# Patient Record
Sex: FEMALE | Race: WHITE | Employment: UNEMPLOYED | URBAN - METROPOLITAN AREA
[De-identification: names, ages, dates, MRNs, and addresses within clinical notes are randomized per-mention and may not be internally consistent; named-entity substitution may affect disease eponyms.]

---

## 2021-01-01 ENCOUNTER — APPOINTMENT (OUTPATIENT)
Dept: ULTRASOUND IMAGING | Age: 71
DRG: 749 | End: 2021-01-01
Attending: FAMILY MEDICINE
Payer: MEDICARE

## 2021-01-01 ENCOUNTER — HOSPITAL ENCOUNTER (EMERGENCY)
Age: 71
Discharge: ANOTHER HOSPITAL | End: 2021-02-02
Attending: EMERGENCY MEDICINE
Payer: MEDICARE

## 2021-01-01 ENCOUNTER — TELEPHONE (OUTPATIENT)
Dept: ONCOLOGY | Age: 71
End: 2021-01-01

## 2021-01-01 ENCOUNTER — APPOINTMENT (OUTPATIENT)
Dept: CT IMAGING | Age: 71
DRG: 749 | End: 2021-01-01
Attending: FAMILY MEDICINE
Payer: MEDICARE

## 2021-01-01 ENCOUNTER — TELEPHONE (OUTPATIENT)
Dept: INFUSION THERAPY | Facility: MEDICAL CENTER | Age: 71
End: 2021-01-01

## 2021-01-01 ENCOUNTER — HOSPITAL ENCOUNTER (OUTPATIENT)
Facility: MEDICAL CENTER | Age: 71
End: 2021-01-01
Payer: MEDICARE

## 2021-01-01 ENCOUNTER — HOSPITAL ENCOUNTER (OUTPATIENT)
Dept: INFUSION THERAPY | Facility: MEDICAL CENTER | Age: 71
Discharge: HOME | End: 2021-02-08
Payer: MEDICARE

## 2021-01-01 ENCOUNTER — HOSPITAL ENCOUNTER (INPATIENT)
Age: 71
LOS: 4 days | Discharge: HOME | DRG: 749 | End: 2021-02-06
Attending: FAMILY MEDICINE | Admitting: FAMILY MEDICINE
Payer: MEDICARE

## 2021-01-01 ENCOUNTER — APPOINTMENT (OUTPATIENT)
Dept: CT IMAGING | Age: 71
End: 2021-01-01
Payer: MEDICARE

## 2021-01-01 ENCOUNTER — ANESTHESIA (OUTPATIENT)
Dept: OPERATING ROOM | Age: 71
End: 2021-01-01

## 2021-01-01 ENCOUNTER — OFFICE VISIT (OUTPATIENT)
Dept: ONCOLOGY | Age: 71
End: 2021-01-01
Payer: MEDICARE

## 2021-01-01 ENCOUNTER — APPOINTMENT (OUTPATIENT)
Dept: INTERVENTIONAL RADIOLOGY/VASCULAR | Age: 71
DRG: 749 | End: 2021-01-01
Attending: FAMILY MEDICINE
Payer: MEDICARE

## 2021-01-01 ENCOUNTER — APPOINTMENT (OUTPATIENT)
Dept: GENERAL RADIOLOGY | Age: 71
End: 2021-01-01
Payer: MEDICARE

## 2021-01-01 ENCOUNTER — ANESTHESIA EVENT (OUTPATIENT)
Dept: OPERATING ROOM | Age: 71
End: 2021-01-01

## 2021-01-01 ENCOUNTER — SOCIAL WORK (OUTPATIENT)
Dept: ONCOLOGY | Age: 71
End: 2021-01-01

## 2021-01-01 ENCOUNTER — INITIAL CONSULT (OUTPATIENT)
Dept: ONCOLOGY | Age: 71
End: 2021-01-01
Payer: MEDICARE

## 2021-01-01 ENCOUNTER — APPOINTMENT (OUTPATIENT)
Dept: GENERAL RADIOLOGY | Age: 71
DRG: 749 | End: 2021-01-01
Attending: FAMILY MEDICINE
Payer: MEDICARE

## 2021-01-01 VITALS
SYSTOLIC BLOOD PRESSURE: 128 MMHG | TEMPERATURE: 97.3 F | WEIGHT: 184.08 LBS | HEART RATE: 68 BPM | OXYGEN SATURATION: 90 % | DIASTOLIC BLOOD PRESSURE: 82 MMHG | RESPIRATION RATE: 22 BRPM

## 2021-01-01 VITALS
DIASTOLIC BLOOD PRESSURE: 103 MMHG | TEMPERATURE: 98.4 F | WEIGHT: 200 LBS | OXYGEN SATURATION: 97 % | SYSTOLIC BLOOD PRESSURE: 162 MMHG | RESPIRATION RATE: 20 BRPM | HEART RATE: 111 BPM

## 2021-01-01 VITALS — WEIGHT: 180.8 LBS | HEIGHT: 68 IN | BODY MASS INDEX: 27.4 KG/M2 | RESPIRATION RATE: 16 BRPM

## 2021-01-01 DIAGNOSIS — C56.2 MALIGNANT NEOPLASM OF LEFT OVARY (HCC): Primary | ICD-10-CM

## 2021-01-01 DIAGNOSIS — J91.0 PLEURAL EFFUSION, MALIGNANT: ICD-10-CM

## 2021-01-01 DIAGNOSIS — R18.0 MALIGNANT ASCITES: ICD-10-CM

## 2021-01-01 DIAGNOSIS — R09.02 HYPOXIA: ICD-10-CM

## 2021-01-01 DIAGNOSIS — N83.8 OVARIAN MASS: Primary | ICD-10-CM

## 2021-01-01 DIAGNOSIS — C76.2 ABDOMINAL CARCINOMATOSIS (HCC): ICD-10-CM

## 2021-01-01 LAB
ABSOLUTE EOS #: 0.03 K/UL (ref 0–0.44)
ABSOLUTE EOS #: <0.03 K/UL (ref 0–0.44)
ABSOLUTE IMMATURE GRANULOCYTE: 0.04 K/UL (ref 0–0.3)
ABSOLUTE IMMATURE GRANULOCYTE: 0.07 K/UL (ref 0–0.3)
ABSOLUTE LYMPH #: 1.22 K/UL (ref 1.1–3.7)
ABSOLUTE LYMPH #: 1.42 K/UL (ref 1.1–3.7)
ABSOLUTE MONO #: 0.98 K/UL (ref 0.1–1.2)
ABSOLUTE MONO #: 1 K/UL (ref 0.1–1.2)
ALBUMIN FLUID: 2 G/DL
ALBUMIN FLUID: 2.2 G/DL
ALBUMIN FLUID: 2.4 G/DL
ALBUMIN SERPL-MCNC: 2.9 G/DL (ref 3.5–5.2)
ALBUMIN SERPL-MCNC: 2.9 G/DL (ref 3.5–5.2)
ALBUMIN SERPL-MCNC: 3 G/DL (ref 3.5–5.2)
ALBUMIN/GLOBULIN RATIO: ABNORMAL (ref 1–2.5)
ALBUMIN/GLOBULIN RATIO: ABNORMAL (ref 1–2.5)
ALP BLD-CCNC: 123 U/L (ref 35–104)
ALP BLD-CCNC: 134 U/L (ref 35–104)
ALT SERPL-CCNC: 8 U/L (ref 5–33)
ALT SERPL-CCNC: 8 U/L (ref 5–33)
AMYLASE FLUID: 53 U/L
AMYLASE FLUID: 53 U/L
AMYLASE FLUID: 85 U/L
AMYLASE: 95 U/L (ref 28–100)
ANION GAP SERPL CALCULATED.3IONS-SCNC: 10 MMOL/L (ref 9–17)
ANION GAP SERPL CALCULATED.3IONS-SCNC: 11 MMOL/L (ref 9–17)
ANION GAP SERPL CALCULATED.3IONS-SCNC: 12 MMOL/L (ref 9–17)
ANION GAP SERPL CALCULATED.3IONS-SCNC: 14 MMOL/L (ref 9–17)
ANION GAP SERPL CALCULATED.3IONS-SCNC: 9 MMOL/L (ref 9–17)
APPEARANCE FLUID: NORMAL
AST SERPL-CCNC: 19 U/L
AST SERPL-CCNC: 22 U/L
BASO FLUID: NORMAL %
BASOPHILS # BLD: 0 % (ref 0–2)
BASOPHILS # BLD: 0 % (ref 0–2)
BASOPHILS ABSOLUTE: <0.03 K/UL (ref 0–0.2)
BASOPHILS ABSOLUTE: <0.03 K/UL (ref 0–0.2)
BILIRUB SERPL-MCNC: 0.75 MG/DL (ref 0.3–1.2)
BILIRUB SERPL-MCNC: 0.86 MG/DL (ref 0.3–1.2)
BILIRUBIN DIRECT: 0.45 MG/DL
BILIRUBIN, INDIRECT: 0.3 MG/DL (ref 0–1)
BNP INTERPRETATION: ABNORMAL
BUN BLDV-MCNC: 14 MG/DL (ref 8–23)
BUN BLDV-MCNC: 19 MG/DL (ref 8–23)
BUN BLDV-MCNC: 22 MG/DL (ref 8–23)
BUN BLDV-MCNC: 22 MG/DL (ref 8–23)
BUN BLDV-MCNC: 28 MG/DL (ref 8–23)
BUN/CREAT BLD: 22 (ref 9–20)
BUN/CREAT BLD: 33 (ref 9–20)
BUN/CREAT BLD: ABNORMAL (ref 9–20)
CA 125: 616 U/ML
CALCIUM SERPL-MCNC: 8.3 MG/DL (ref 8.6–10.4)
CALCIUM SERPL-MCNC: 8.3 MG/DL (ref 8.6–10.4)
CALCIUM SERPL-MCNC: 8.4 MG/DL (ref 8.6–10.4)
CALCIUM SERPL-MCNC: 8.8 MG/DL (ref 8.6–10.4)
CALCIUM SERPL-MCNC: 8.8 MG/DL (ref 8.6–10.4)
CARCINOEMBRYONIC ANTIGEN: 1.4 NG/ML
CASE NUMBER:: NORMAL
CHLORIDE BLD-SCNC: 100 MMOL/L (ref 98–107)
CHLORIDE BLD-SCNC: 93 MMOL/L (ref 98–107)
CHLORIDE BLD-SCNC: 94 MMOL/L (ref 98–107)
CHLORIDE BLD-SCNC: 97 MMOL/L (ref 98–107)
CHLORIDE BLD-SCNC: 97 MMOL/L (ref 98–107)
CO2: 26 MMOL/L (ref 20–31)
CO2: 27 MMOL/L (ref 20–31)
CO2: 28 MMOL/L (ref 20–31)
COLOR FLUID: NORMAL
CREAT SERPL-MCNC: 0.5 MG/DL (ref 0.5–0.9)
CREAT SERPL-MCNC: 0.69 MG/DL (ref 0.5–0.9)
CREAT SERPL-MCNC: 0.81 MG/DL (ref 0.5–0.9)
CREAT SERPL-MCNC: 0.84 MG/DL (ref 0.5–0.9)
CREAT SERPL-MCNC: 1.02 MG/DL (ref 0.5–0.9)
CULTURE: ABNORMAL
D-DIMER QUANTITATIVE: 15.02 MG/L FEU
D-DIMER QUANTITATIVE: 19.84 MG/L FEU (ref 0–0.59)
DIFFERENTIAL TYPE: ABNORMAL
DIFFERENTIAL TYPE: ABNORMAL
DIRECT EXAM: ABNORMAL
DIRECT EXAM: NORMAL
EKG ATRIAL RATE: 79 BPM
EKG ATRIAL RATE: 80 BPM
EKG P AXIS: -2 DEGREES
EKG P AXIS: 1 DEGREES
EKG P-R INTERVAL: 160 MS
EKG P-R INTERVAL: 162 MS
EKG Q-T INTERVAL: 384 MS
EKG Q-T INTERVAL: 388 MS
EKG QRS DURATION: 74 MS
EKG QRS DURATION: 76 MS
EKG QTC CALCULATION (BAZETT): 442 MS
EKG QTC CALCULATION (BAZETT): 444 MS
EKG R AXIS: -33 DEGREES
EKG R AXIS: -34 DEGREES
EKG T AXIS: 35 DEGREES
EKG T AXIS: 38 DEGREES
EKG VENTRICULAR RATE: 79 BPM
EKG VENTRICULAR RATE: 80 BPM
EOSINOPHIL FLUID: NORMAL %
EOSINOPHILS RELATIVE PERCENT: 0 % (ref 1–4)
EOSINOPHILS RELATIVE PERCENT: 0 % (ref 1–4)
FLOW CYTOMETRY SOURCE: NORMAL
FLOW CYTOMETRY SOURCE: NORMAL
FLOW CYTOMETRY, NODE/FLUID: NORMAL
FLOW CYTOMETRY, NODE/FLUID: NORMAL
FLUID DIFF COMMENT: NORMAL
GFR AFRICAN AMERICAN: >60 ML/MIN
GFR NON-AFRICAN AMERICAN: 54 ML/MIN
GFR NON-AFRICAN AMERICAN: >60 ML/MIN
GFR SERPL CREATININE-BSD FRML MDRD: ABNORMAL ML/MIN/{1.73_M2}
GLOBULIN: ABNORMAL G/DL (ref 1.5–3.8)
GLUCOSE BLD-MCNC: 110 MG/DL (ref 70–99)
GLUCOSE BLD-MCNC: 125 MG/DL (ref 70–99)
GLUCOSE BLD-MCNC: 79 MG/DL (ref 70–99)
GLUCOSE BLD-MCNC: 90 MG/DL (ref 70–99)
GLUCOSE BLD-MCNC: 90 MG/DL (ref 70–99)
GLUCOSE, FLUID: 90 MG/DL
GLUCOSE, FLUID: 91 MG/DL
GLUCOSE, FLUID: 94 MG/DL
HCT VFR BLD CALC: 45.6 % (ref 36.3–47.1)
HCT VFR BLD CALC: 45.8 % (ref 36.3–47.1)
HCT VFR BLD CALC: 45.9 % (ref 36.3–47.1)
HCT VFR BLD CALC: 46.7 % (ref 36.3–47.1)
HCT VFR BLD CALC: 47.1 % (ref 36.3–47.1)
HCT VFR BLD CALC: 50.1 % (ref 36.3–47.1)
HEMOGLOBIN: 13.4 G/DL (ref 11.9–15.1)
HEMOGLOBIN: 13.9 G/DL (ref 11.9–15.1)
HEMOGLOBIN: 14 G/DL (ref 11.9–15.1)
HEMOGLOBIN: 14.2 G/DL (ref 11.9–15.1)
HEMOGLOBIN: 14.3 G/DL (ref 11.9–15.1)
HEMOGLOBIN: 15.4 G/DL (ref 11.9–15.1)
IMMATURE GRANULOCYTES: 1 %
IMMATURE GRANULOCYTES: 1 %
INR BLD: 1.4
INR BLD: 1.7
INR BLD: 1.8
INR BLD: 1.8
LACTATE DEHYDROGENASE, FLUID: 390 U/L
LACTATE DEHYDROGENASE, FLUID: 432 U/L
LACTATE DEHYDROGENASE, FLUID: 598 U/L
LACTATE DEHYDROGENASE: 410 U/L (ref 135–214)
LIPASE: 33 U/L (ref 13–60)
LV EF: 55 %
LVEF MODALITY: NORMAL
LYMPHOCYTES # BLD: 14 % (ref 24–43)
LYMPHOCYTES # BLD: 14 % (ref 24–43)
LYMPHOCYTES, BODY FLUID: 32 %
LYMPHOCYTES, BODY FLUID: 66 %
LYMPHOCYTES, BODY FLUID: 68 %
Lab: ABNORMAL
Lab: NORMAL
MCH RBC QN AUTO: 23.8 PG (ref 25.2–33.5)
MCH RBC QN AUTO: 23.9 PG (ref 25.2–33.5)
MCH RBC QN AUTO: 24.1 PG (ref 25.2–33.5)
MCH RBC QN AUTO: 24.1 PG (ref 25.2–33.5)
MCH RBC QN AUTO: 24.3 PG (ref 25.2–33.5)
MCHC RBC AUTO-ENTMCNC: 29.4 G/DL (ref 28.4–34.8)
MCHC RBC AUTO-ENTMCNC: 30.3 G/DL (ref 28.4–34.8)
MCHC RBC AUTO-ENTMCNC: 30.4 G/DL (ref 28.4–34.8)
MCHC RBC AUTO-ENTMCNC: 30.5 G/DL (ref 28.4–34.8)
MCHC RBC AUTO-ENTMCNC: 30.7 G/DL (ref 28.4–34.8)
MCV RBC AUTO: 78.5 FL (ref 82.6–102.9)
MCV RBC AUTO: 78.9 FL (ref 82.6–102.9)
MCV RBC AUTO: 78.9 FL (ref 82.6–102.9)
MCV RBC AUTO: 79.5 FL (ref 82.6–102.9)
MCV RBC AUTO: 80.9 FL (ref 82.6–102.9)
MONOCYTE, FLUID: NORMAL %
MONOCYTES # BLD: 10 % (ref 3–12)
MONOCYTES # BLD: 12 % (ref 3–12)
NEUTROPHIL, FLUID: 1 %
NEUTROPHIL, FLUID: 1 %
NEUTROPHIL, FLUID: 5 %
NRBC AUTOMATED: 0 PER 100 WBC
OTHER CELLS FLUID: NORMAL %
PARTIAL THROMBOPLASTIN TIME: 26.3 SEC (ref 23.9–33.8)
PDW BLD-RTO: 15.9 % (ref 11.8–14.4)
PDW BLD-RTO: 16 % (ref 11.8–14.4)
PDW BLD-RTO: 16 % (ref 11.8–14.4)
PDW BLD-RTO: 16.1 % (ref 11.8–14.4)
PDW BLD-RTO: 17 % (ref 11.8–14.4)
PH FLUID: 8
PH FLUID: 8.5
PLATELET # BLD: 265 K/UL (ref 138–453)
PLATELET # BLD: 295 K/UL (ref 138–453)
PLATELET # BLD: 319 K/UL (ref 138–453)
PLATELET # BLD: 324 K/UL (ref 138–453)
PLATELET # BLD: 360 K/UL (ref 138–453)
PLATELET ESTIMATE: ABNORMAL
PLATELET ESTIMATE: ABNORMAL
PMV BLD AUTO: 10 FL (ref 8.1–13.5)
PMV BLD AUTO: 10 FL (ref 8.1–13.5)
PMV BLD AUTO: 10.2 FL (ref 8.1–13.5)
PMV BLD AUTO: 10.5 FL (ref 8.1–13.5)
PMV BLD AUTO: 9.5 FL (ref 8.1–13.5)
POTASSIUM SERPL-SCNC: 3.7 MMOL/L (ref 3.7–5.3)
POTASSIUM SERPL-SCNC: 3.8 MMOL/L (ref 3.7–5.3)
POTASSIUM SERPL-SCNC: 4 MMOL/L (ref 3.7–5.3)
POTASSIUM SERPL-SCNC: 4.2 MMOL/L (ref 3.7–5.3)
POTASSIUM SERPL-SCNC: 4.6 MMOL/L (ref 3.7–5.3)
PRO-BNP: 1213 PG/ML
PROTHROMBIN TIME: 14.5 SEC (ref 9–12)
PROTHROMBIN TIME: 18.4 SEC (ref 9–12)
PROTHROMBIN TIME: 18.8 SEC (ref 9–12)
PROTHROMBIN TIME: 19.4 SEC (ref 11.5–14.2)
RBC # BLD: 5.64 M/UL (ref 3.95–5.11)
RBC # BLD: 5.76 M/UL (ref 3.95–5.11)
RBC # BLD: 5.82 M/UL (ref 3.95–5.11)
RBC # BLD: 5.95 M/UL (ref 3.95–5.11)
RBC # BLD: 6.35 M/UL (ref 3.95–5.11)
RBC # BLD: ABNORMAL 10*6/UL
RBC # BLD: ABNORMAL 10*6/UL
RBC FLUID: 6000 /MM3
RBC FLUID: NORMAL /MM3
RBC FLUID: NORMAL /MM3
SARS-COV-2, RAPID: NOT DETECTED
SARS-COV-2: NORMAL
SARS-COV-2: NORMAL
SEG NEUTROPHILS: 73 % (ref 36–65)
SEG NEUTROPHILS: 75 % (ref 36–65)
SEGMENTED NEUTROPHILS ABSOLUTE COUNT: 6.24 K/UL (ref 1.5–8.1)
SEGMENTED NEUTROPHILS ABSOLUTE COUNT: 7.52 K/UL (ref 1.5–8.1)
SODIUM BLD-SCNC: 131 MMOL/L (ref 135–144)
SODIUM BLD-SCNC: 135 MMOL/L (ref 135–144)
SODIUM BLD-SCNC: 136 MMOL/L (ref 135–144)
SOURCE: NORMAL
SPECIMEN DESCRIPTION: ABNORMAL
SPECIMEN DESCRIPTION: NORMAL
SPECIMEN TYPE: NORMAL
SURGICAL PATHOLOGY REPORT: NORMAL
SURGICAL PATHOLOGY REPORT: NORMAL
TOTAL PROTEIN, BODY FLUID: 5.3 G/DL
TOTAL PROTEIN, BODY FLUID: 5.3 G/DL
TOTAL PROTEIN, BODY FLUID: 5.7 G/DL
TOTAL PROTEIN: 7.1 G/DL (ref 6.4–8.3)
TOTAL PROTEIN: 7.9 G/DL (ref 6.4–8.3)
TOTAL PROTEIN: 8.4 G/DL (ref 6.4–8.3)
TSH SERPL DL<=0.05 MIU/L-ACNC: 0.86 MIU/L (ref 0.3–5)
WBC # BLD: 10 K/UL (ref 3.5–11.3)
WBC # BLD: 6.1 K/UL (ref 3.5–11.3)
WBC # BLD: 6.6 K/UL (ref 3.5–11.3)
WBC # BLD: 7 K/UL (ref 3.5–11.3)
WBC # BLD: 8.5 K/UL (ref 3.5–11.3)
WBC # BLD: ABNORMAL 10*3/UL
WBC # BLD: ABNORMAL 10*3/UL
WBC FLUID: 1422 /MM3
WBC FLUID: 2946 /MM3
WBC FLUID: 598 /MM3

## 2021-01-01 PROCEDURE — 80048 BASIC METABOLIC PNL TOTAL CA: CPT

## 2021-01-01 PROCEDURE — 80053 COMPREHEN METABOLIC PANEL: CPT

## 2021-01-01 PROCEDURE — 99223 1ST HOSP IP/OBS HIGH 75: CPT | Performed by: FAMILY MEDICINE

## 2021-01-01 PROCEDURE — 87206 SMEAR FLUORESCENT/ACID STAI: CPT

## 2021-01-01 PROCEDURE — 99232 SBSQ HOSP IP/OBS MODERATE 35: CPT | Performed by: INTERNAL MEDICINE

## 2021-01-01 PROCEDURE — 88341 IMHCHEM/IMCYTCHM EA ADD ANTB: CPT

## 2021-01-01 PROCEDURE — 82945 GLUCOSE OTHER FLUID: CPT

## 2021-01-01 PROCEDURE — 6370000000 HC RX 637 (ALT 250 FOR IP): Performed by: FAMILY MEDICINE

## 2021-01-01 PROCEDURE — 84157 ASSAY OF PROTEIN OTHER: CPT

## 2021-01-01 PROCEDURE — 85379 FIBRIN DEGRADATION QUANT: CPT

## 2021-01-01 PROCEDURE — 82150 ASSAY OF AMYLASE: CPT

## 2021-01-01 PROCEDURE — 77001 FLUOROGUIDE FOR VEIN DEVICE: CPT

## 2021-01-01 PROCEDURE — 2500000003 HC RX 250 WO HCPCS: Performed by: STUDENT IN AN ORGANIZED HEALTH CARE EDUCATION/TRAINING PROGRAM

## 2021-01-01 PROCEDURE — 84155 ASSAY OF PROTEIN SERUM: CPT

## 2021-01-01 PROCEDURE — 83690 ASSAY OF LIPASE: CPT

## 2021-01-01 PROCEDURE — 36415 COLL VENOUS BLD VENIPUNCTURE: CPT

## 2021-01-01 PROCEDURE — 99223 1ST HOSP IP/OBS HIGH 75: CPT | Performed by: OBSTETRICS & GYNECOLOGY

## 2021-01-01 PROCEDURE — 88342 IMHCHEM/IMCYTCHM 1ST ANTB: CPT

## 2021-01-01 PROCEDURE — 88185 FLOWCYTOMETRY/TC ADD-ON: CPT

## 2021-01-01 PROCEDURE — 2580000003 HC RX 258: Performed by: INTERNAL MEDICINE

## 2021-01-01 PROCEDURE — 74177 CT ABD & PELVIS W/CONTRAST: CPT

## 2021-01-01 PROCEDURE — 96413 CHEMO IV INFUSION 1 HR: CPT

## 2021-01-01 PROCEDURE — 0W9B3ZZ DRAINAGE OF LEFT PLEURAL CAVITY, PERCUTANEOUS APPROACH: ICD-10-PCS | Performed by: PHYSICIAN ASSISTANT

## 2021-01-01 PROCEDURE — C1788 PORT, INDWELLING, IMP: HCPCS

## 2021-01-01 PROCEDURE — 85025 COMPLETE CBC W/AUTO DIFF WBC: CPT

## 2021-01-01 PROCEDURE — 85014 HEMATOCRIT: CPT

## 2021-01-01 PROCEDURE — 36591 DRAW BLOOD OFF VENOUS DEVICE: CPT

## 2021-01-01 PROCEDURE — 6360000004 HC RX CONTRAST MEDICATION: Performed by: NURSE PRACTITIONER

## 2021-01-01 PROCEDURE — 88112 CYTOPATH CELL ENHANCE TECH: CPT

## 2021-01-01 PROCEDURE — 2500000003 HC RX 250 WO HCPCS: Performed by: NURSE PRACTITIONER

## 2021-01-01 PROCEDURE — 88305 TISSUE EXAM BY PATHOLOGIST: CPT

## 2021-01-01 PROCEDURE — 96365 THER/PROPH/DIAG IV INF INIT: CPT

## 2021-01-01 PROCEDURE — 85610 PROTHROMBIN TIME: CPT

## 2021-01-01 PROCEDURE — 2709999900 US THORACENTESIS

## 2021-01-01 PROCEDURE — 6360000002 HC RX W HCPCS: Performed by: RADIOLOGY

## 2021-01-01 PROCEDURE — 99222 1ST HOSP IP/OBS MODERATE 55: CPT | Performed by: INTERNAL MEDICINE

## 2021-01-01 PROCEDURE — 94640 AIRWAY INHALATION TREATMENT: CPT

## 2021-01-01 PROCEDURE — 82378 CARCINOEMBRYONIC ANTIGEN: CPT

## 2021-01-01 PROCEDURE — 87205 SMEAR GRAM STAIN: CPT

## 2021-01-01 PROCEDURE — 85027 COMPLETE CBC AUTOMATED: CPT

## 2021-01-01 PROCEDURE — 6370000000 HC RX 637 (ALT 250 FOR IP): Performed by: NURSE PRACTITIONER

## 2021-01-01 PROCEDURE — 89051 BODY FLUID CELL COUNT: CPT

## 2021-01-01 PROCEDURE — 87075 CULTR BACTERIA EXCEPT BLOOD: CPT

## 2021-01-01 PROCEDURE — 87070 CULTURE OTHR SPECIMN AEROBIC: CPT

## 2021-01-01 PROCEDURE — 2580000003 HC RX 258: Performed by: NURSE PRACTITIONER

## 2021-01-01 PROCEDURE — 02HV33Z INSERTION OF INFUSION DEVICE INTO SUPERIOR VENA CAVA, PERCUTANEOUS APPROACH: ICD-10-PCS | Performed by: PHYSICIAN ASSISTANT

## 2021-01-01 PROCEDURE — 1090F PRES/ABSN URINE INCON ASSESS: CPT | Performed by: INTERNAL MEDICINE

## 2021-01-01 PROCEDURE — 83615 LACTATE (LD) (LDH) ENZYME: CPT

## 2021-01-01 PROCEDURE — 1111F DSCHRG MED/CURRENT MED MERGE: CPT | Performed by: INTERNAL MEDICINE

## 2021-01-01 PROCEDURE — 2060000000 HC ICU INTERMEDIATE R&B

## 2021-01-01 PROCEDURE — 99211 OFF/OP EST MAY X REQ PHY/QHP: CPT

## 2021-01-01 PROCEDURE — 1123F ACP DISCUSS/DSCN MKR DOCD: CPT | Performed by: INTERNAL MEDICINE

## 2021-01-01 PROCEDURE — 83986 ASSAY PH BODY FLUID NOS: CPT

## 2021-01-01 PROCEDURE — 97162 PT EVAL MOD COMPLEX 30 MIN: CPT

## 2021-01-01 PROCEDURE — 94761 N-INVAS EAR/PLS OXIMETRY MLT: CPT

## 2021-01-01 PROCEDURE — 86403 PARTICLE AGGLUT ANTBDY SCRN: CPT

## 2021-01-01 PROCEDURE — APPSS45 APP SPLIT SHARED TIME 31-45 MINUTES: Performed by: NURSE PRACTITIONER

## 2021-01-01 PROCEDURE — 93306 TTE W/DOPPLER COMPLETE: CPT

## 2021-01-01 PROCEDURE — 71046 X-RAY EXAM CHEST 2 VIEWS: CPT

## 2021-01-01 PROCEDURE — 4040F PNEUMOC VAC/ADMIN/RCVD: CPT | Performed by: INTERNAL MEDICINE

## 2021-01-01 PROCEDURE — 2580000003 HC RX 258: Performed by: RADIOLOGY

## 2021-01-01 PROCEDURE — 87102 FUNGUS ISOLATION CULTURE: CPT

## 2021-01-01 PROCEDURE — 96375 TX/PRO/DX INJ NEW DRUG ADDON: CPT

## 2021-01-01 PROCEDURE — 2709999900 HC NON-CHARGEABLE SUPPLY

## 2021-01-01 PROCEDURE — 96417 CHEMO IV INFUS EACH ADDL SEQ: CPT

## 2021-01-01 PROCEDURE — P9047 ALBUMIN (HUMAN), 25%, 50ML: HCPCS | Performed by: PHYSICIAN ASSISTANT

## 2021-01-01 PROCEDURE — 6360000002 HC RX W HCPCS: Performed by: NURSE PRACTITIONER

## 2021-01-01 PROCEDURE — G8400 PT W/DXA NO RESULTS DOC: HCPCS | Performed by: INTERNAL MEDICINE

## 2021-01-01 PROCEDURE — 99233 SBSQ HOSP IP/OBS HIGH 50: CPT | Performed by: FAMILY MEDICINE

## 2021-01-01 PROCEDURE — 6360000002 HC RX W HCPCS: Performed by: INTERNAL MEDICINE

## 2021-01-01 PROCEDURE — 0W993ZZ DRAINAGE OF RIGHT PLEURAL CAVITY, PERCUTANEOUS APPROACH: ICD-10-PCS | Performed by: PHYSICIAN ASSISTANT

## 2021-01-01 PROCEDURE — 96372 THER/PROPH/DIAG INJ SC/IM: CPT

## 2021-01-01 PROCEDURE — 87186 SC STD MICRODIL/AGAR DIL: CPT

## 2021-01-01 PROCEDURE — 97530 THERAPEUTIC ACTIVITIES: CPT

## 2021-01-01 PROCEDURE — C1894 INTRO/SHEATH, NON-LASER: HCPCS

## 2021-01-01 PROCEDURE — 99215 OFFICE O/P EST HI 40 MIN: CPT | Performed by: INTERNAL MEDICINE

## 2021-01-01 PROCEDURE — G8427 DOCREV CUR MEDS BY ELIG CLIN: HCPCS | Performed by: INTERNAL MEDICINE

## 2021-01-01 PROCEDURE — 71260 CT THORAX DX C+: CPT

## 2021-01-01 PROCEDURE — 87077 CULTURE AEROBIC IDENTIFY: CPT

## 2021-01-01 PROCEDURE — 99232 SBSQ HOSP IP/OBS MODERATE 35: CPT | Performed by: OBSTETRICS & GYNECOLOGY

## 2021-01-01 PROCEDURE — G8417 CALC BMI ABV UP PARAM F/U: HCPCS | Performed by: INTERNAL MEDICINE

## 2021-01-01 PROCEDURE — 2700000000 HC OXYGEN THERAPY PER DAY

## 2021-01-01 PROCEDURE — 87015 SPECIMEN INFECT AGNT CONCNTJ: CPT

## 2021-01-01 PROCEDURE — 82042 OTHER SOURCE ALBUMIN QUAN EA: CPT

## 2021-01-01 PROCEDURE — 6360000002 HC RX W HCPCS: Performed by: FAMILY MEDICINE

## 2021-01-01 PROCEDURE — C1729 CATH, DRAINAGE: HCPCS

## 2021-01-01 PROCEDURE — 0W9G3ZZ DRAINAGE OF PERITONEAL CAVITY, PERCUTANEOUS APPROACH: ICD-10-PCS | Performed by: PHYSICIAN ASSISTANT

## 2021-01-01 PROCEDURE — 71045 X-RAY EXAM CHEST 1 VIEW: CPT

## 2021-01-01 PROCEDURE — 88184 FLOWCYTOMETRY/ TC 1 MARKER: CPT

## 2021-01-01 PROCEDURE — 2580000003 HC RX 258: Performed by: FAMILY MEDICINE

## 2021-01-01 PROCEDURE — 99232 SBSQ HOSP IP/OBS MODERATE 35: CPT | Performed by: FAMILY MEDICINE

## 2021-01-01 PROCEDURE — 0JH60WZ INSERTION OF TOTALLY IMPLANTABLE VASCULAR ACCESS DEVICE INTO CHEST SUBCUTANEOUS TISSUE AND FASCIA, OPEN APPROACH: ICD-10-PCS | Performed by: PHYSICIAN ASSISTANT

## 2021-01-01 PROCEDURE — 2709999900 US GUIDED PARACENTESIS

## 2021-01-01 PROCEDURE — 85018 HEMOGLOBIN: CPT

## 2021-01-01 PROCEDURE — 2500000003 HC RX 250 WO HCPCS: Performed by: INTERNAL MEDICINE

## 2021-01-01 PROCEDURE — 94660 CPAP INITIATION&MGMT: CPT

## 2021-01-01 PROCEDURE — 99239 HOSP IP/OBS DSCHRG MGMT >30: CPT | Performed by: FAMILY MEDICINE

## 2021-01-01 PROCEDURE — 93970 EXTREMITY STUDY: CPT

## 2021-01-01 PROCEDURE — G8484 FLU IMMUNIZE NO ADMIN: HCPCS | Performed by: INTERNAL MEDICINE

## 2021-01-01 PROCEDURE — U0002 COVID-19 LAB TEST NON-CDC: HCPCS

## 2021-01-01 PROCEDURE — 83880 ASSAY OF NATRIURETIC PEPTIDE: CPT

## 2021-01-01 PROCEDURE — 84443 ASSAY THYROID STIM HORMONE: CPT

## 2021-01-01 PROCEDURE — 80076 HEPATIC FUNCTION PANEL: CPT

## 2021-01-01 PROCEDURE — 97166 OT EVAL MOD COMPLEX 45 MIN: CPT

## 2021-01-01 PROCEDURE — 82040 ASSAY OF SERUM ALBUMIN: CPT

## 2021-01-01 PROCEDURE — 87116 MYCOBACTERIA CULTURE: CPT

## 2021-01-01 PROCEDURE — 85730 THROMBOPLASTIN TIME PARTIAL: CPT

## 2021-01-01 PROCEDURE — 96367 TX/PROPH/DG ADDL SEQ IV INF: CPT

## 2021-01-01 PROCEDURE — 1036F TOBACCO NON-USER: CPT | Performed by: INTERNAL MEDICINE

## 2021-01-01 PROCEDURE — 73562 X-RAY EXAM OF KNEE 3: CPT

## 2021-01-01 PROCEDURE — 36561 INSERT TUNNELED CV CATH: CPT

## 2021-01-01 PROCEDURE — 99283 EMERGENCY DEPT VISIT LOW MDM: CPT

## 2021-01-01 PROCEDURE — 99999 PR OFFICE/OUTPT VISIT,PROCEDURE ONLY: CPT | Performed by: INTERNAL MEDICINE

## 2021-01-01 PROCEDURE — 99223 1ST HOSP IP/OBS HIGH 75: CPT | Performed by: INTERNAL MEDICINE

## 2021-01-01 PROCEDURE — 97535 SELF CARE MNGMENT TRAINING: CPT

## 2021-01-01 PROCEDURE — 6360000004 HC RX CONTRAST MEDICATION

## 2021-01-01 PROCEDURE — 6360000002 HC RX W HCPCS: Performed by: PHYSICIAN ASSISTANT

## 2021-01-01 PROCEDURE — 96415 CHEMO IV INFUSION ADDL HR: CPT

## 2021-01-01 PROCEDURE — 86304 IMMUNOASSAY TUMOR CA 125: CPT

## 2021-01-01 PROCEDURE — 93005 ELECTROCARDIOGRAM TRACING: CPT | Performed by: FAMILY MEDICINE

## 2021-01-01 PROCEDURE — 93010 ELECTROCARDIOGRAM REPORT: CPT | Performed by: INTERNAL MEDICINE

## 2021-01-01 PROCEDURE — 3017F COLORECTAL CA SCREEN DOC REV: CPT | Performed by: INTERNAL MEDICINE

## 2021-01-01 RX ORDER — SODIUM CHLORIDE 0.9 % (FLUSH) 0.9 %
10 SYRINGE (ML) INJECTION PRN
Status: CANCELLED | OUTPATIENT
Start: 2021-03-01

## 2021-01-01 RX ORDER — PROMETHAZINE HYDROCHLORIDE 12.5 MG/1
12.5 TABLET ORAL EVERY 6 HOURS PRN
Status: DISCONTINUED | OUTPATIENT
Start: 2021-01-01 | End: 2021-01-01 | Stop reason: HOSPADM

## 2021-01-01 RX ORDER — METOPROLOL TARTRATE 5 MG/5ML
5 INJECTION INTRAVENOUS ONCE
Status: COMPLETED | OUTPATIENT
Start: 2021-01-01 | End: 2021-01-01

## 2021-01-01 RX ORDER — SODIUM CHLORIDE 0.9 % (FLUSH) 0.9 %
10 SYRINGE (ML) INJECTION EVERY 12 HOURS SCHEDULED
Status: DISCONTINUED | OUTPATIENT
Start: 2021-01-01 | End: 2021-01-01 | Stop reason: HOSPADM

## 2021-01-01 RX ORDER — POTASSIUM CHLORIDE 7.45 MG/ML
10 INJECTION INTRAVENOUS PRN
Status: DISCONTINUED | OUTPATIENT
Start: 2021-01-01 | End: 2021-01-01 | Stop reason: HOSPADM

## 2021-01-01 RX ORDER — POTASSIUM CHLORIDE 20 MEQ/1
40 TABLET, EXTENDED RELEASE ORAL PRN
Status: DISCONTINUED | OUTPATIENT
Start: 2021-01-01 | End: 2021-01-01 | Stop reason: HOSPADM

## 2021-01-01 RX ORDER — HEPARIN SODIUM (PORCINE) LOCK FLUSH IV SOLN 100 UNIT/ML 100 UNIT/ML
SOLUTION INTRAVENOUS
Status: COMPLETED | OUTPATIENT
Start: 2021-01-01 | End: 2021-01-01

## 2021-01-01 RX ORDER — SODIUM CHLORIDE 0.9 % (FLUSH) 0.9 %
10 SYRINGE (ML) INJECTION EVERY 12 HOURS SCHEDULED
Status: DISCONTINUED | OUTPATIENT
Start: 2021-01-01 | End: 2021-01-01 | Stop reason: SDUPTHER

## 2021-01-01 RX ORDER — MEPERIDINE HYDROCHLORIDE 50 MG/ML
12.5 INJECTION INTRAMUSCULAR; INTRAVENOUS; SUBCUTANEOUS ONCE
Status: CANCELLED | OUTPATIENT
Start: 2021-01-01 | End: 2021-01-01

## 2021-01-01 RX ORDER — DIPHENHYDRAMINE HYDROCHLORIDE 50 MG/ML
50 INJECTION INTRAMUSCULAR; INTRAVENOUS ONCE
Status: CANCELLED | OUTPATIENT
Start: 2021-01-01 | End: 2021-01-01

## 2021-01-01 RX ORDER — MEPERIDINE HYDROCHLORIDE 50 MG/ML
12.5 INJECTION INTRAMUSCULAR; INTRAVENOUS; SUBCUTANEOUS ONCE
Status: CANCELLED | OUTPATIENT
Start: 2021-03-22 | End: 2021-03-22

## 2021-01-01 RX ORDER — PALONOSETRON 0.05 MG/ML
0.25 INJECTION, SOLUTION INTRAVENOUS ONCE
Status: CANCELLED | OUTPATIENT
Start: 2021-03-22

## 2021-01-01 RX ORDER — ALBUTEROL SULFATE 2.5 MG/3ML
2.5 SOLUTION RESPIRATORY (INHALATION) EVERY 4 HOURS PRN
Status: DISCONTINUED | OUTPATIENT
Start: 2021-01-01 | End: 2021-01-01 | Stop reason: HOSPADM

## 2021-01-01 RX ORDER — SODIUM CHLORIDE 0.9 % (FLUSH) 0.9 %
10 SYRINGE (ML) INJECTION PRN
Status: DISCONTINUED | OUTPATIENT
Start: 2021-01-01 | End: 2021-01-01

## 2021-01-01 RX ORDER — EPINEPHRINE 1 MG/ML
0.3 INJECTION, SOLUTION, CONCENTRATE INTRAVENOUS PRN
Status: CANCELLED | OUTPATIENT
Start: 2021-03-01

## 2021-01-01 RX ORDER — CARVEDILOL 3.12 MG/1
3.12 TABLET ORAL 2 TIMES DAILY WITH MEALS
Status: DISCONTINUED | OUTPATIENT
Start: 2021-01-01 | End: 2021-01-01

## 2021-01-01 RX ORDER — EPINEPHRINE 1 MG/ML
0.3 INJECTION, SOLUTION, CONCENTRATE INTRAVENOUS PRN
Status: CANCELLED | OUTPATIENT
Start: 2021-03-22

## 2021-01-01 RX ORDER — ONDANSETRON 8 MG/1
8 TABLET, ORALLY DISINTEGRATING ORAL EVERY 8 HOURS PRN
Qty: 30 TABLET | Refills: 3 | Status: SHIPPED | OUTPATIENT
Start: 2021-01-01

## 2021-01-01 RX ORDER — DEXTROSE, SODIUM CHLORIDE, AND POTASSIUM CHLORIDE 5; .45; .15 G/100ML; G/100ML; G/100ML
INJECTION INTRAVENOUS CONTINUOUS
Status: DISCONTINUED | OUTPATIENT
Start: 2021-01-01 | End: 2021-01-01

## 2021-01-01 RX ORDER — MIDODRINE HYDROCHLORIDE 5 MG/1
5 TABLET ORAL ONCE
Status: DISCONTINUED | OUTPATIENT
Start: 2021-01-01 | End: 2021-01-01

## 2021-01-01 RX ORDER — SODIUM CHLORIDE 9 MG/ML
INJECTION, SOLUTION INTRAVENOUS CONTINUOUS
Status: CANCELLED | OUTPATIENT
Start: 2021-03-01

## 2021-01-01 RX ORDER — POLYETHYLENE GLYCOL 3350 17 G/17G
17 POWDER, FOR SOLUTION ORAL DAILY PRN
Status: DISCONTINUED | OUTPATIENT
Start: 2021-01-01 | End: 2021-01-01 | Stop reason: HOSPADM

## 2021-01-01 RX ORDER — SODIUM CHLORIDE 9 MG/ML
INJECTION, SOLUTION INTRAVENOUS CONTINUOUS
Status: DISCONTINUED | OUTPATIENT
Start: 2021-01-01 | End: 2021-01-01 | Stop reason: HOSPADM

## 2021-01-01 RX ORDER — DEXAMETHASONE SODIUM PHOSPHATE 10 MG/ML
10 INJECTION INTRAMUSCULAR; INTRAVENOUS ONCE
Status: COMPLETED | OUTPATIENT
Start: 2021-01-01 | End: 2021-01-01

## 2021-01-01 RX ORDER — FAMOTIDINE 20 MG/1
20 TABLET, FILM COATED ORAL DAILY
Status: DISCONTINUED | OUTPATIENT
Start: 2021-01-01 | End: 2021-01-01 | Stop reason: HOSPADM

## 2021-01-01 RX ORDER — SODIUM CHLORIDE 0.9 % (FLUSH) 0.9 %
10 SYRINGE (ML) INJECTION PRN
Status: DISCONTINUED | OUTPATIENT
Start: 2021-01-01 | End: 2021-01-01 | Stop reason: HOSPADM

## 2021-01-01 RX ORDER — DIPHENHYDRAMINE HYDROCHLORIDE 50 MG/ML
50 INJECTION INTRAMUSCULAR; INTRAVENOUS ONCE
Status: CANCELLED | OUTPATIENT
Start: 2021-03-01 | End: 2021-03-01

## 2021-01-01 RX ORDER — POLYETHYLENE GLYCOL 3350 17 G/17G
17 POWDER, FOR SOLUTION ORAL DAILY PRN
Qty: 527 G | Refills: 0 | Status: SHIPPED | OUTPATIENT
Start: 2021-01-01 | End: 2021-03-08

## 2021-01-01 RX ORDER — MEPERIDINE HYDROCHLORIDE 50 MG/ML
12.5 INJECTION INTRAMUSCULAR; INTRAVENOUS; SUBCUTANEOUS ONCE
Status: CANCELLED | OUTPATIENT
Start: 2021-03-01 | End: 2021-03-01

## 2021-01-01 RX ORDER — HYDRALAZINE HYDROCHLORIDE 20 MG/ML
10 INJECTION INTRAMUSCULAR; INTRAVENOUS EVERY 6 HOURS PRN
Status: DISCONTINUED | OUTPATIENT
Start: 2021-01-01 | End: 2021-01-01 | Stop reason: HOSPADM

## 2021-01-01 RX ORDER — HEPARIN SODIUM (PORCINE) LOCK FLUSH IV SOLN 100 UNIT/ML 100 UNIT/ML
500 SOLUTION INTRAVENOUS PRN
Status: CANCELLED | OUTPATIENT
Start: 2021-01-01

## 2021-01-01 RX ORDER — HEPARIN SODIUM (PORCINE) LOCK FLUSH IV SOLN 100 UNIT/ML 100 UNIT/ML
500 SOLUTION INTRAVENOUS PRN
Status: CANCELLED | OUTPATIENT
Start: 2021-03-22

## 2021-01-01 RX ORDER — 0.9 % SODIUM CHLORIDE 0.9 %
80 INTRAVENOUS SOLUTION INTRAVENOUS ONCE
Status: COMPLETED | OUTPATIENT
Start: 2021-01-01 | End: 2021-01-01

## 2021-01-01 RX ORDER — FAMOTIDINE 20 MG/1
20 TABLET, FILM COATED ORAL DAILY
Qty: 60 TABLET | Refills: 3 | Status: SHIPPED | OUTPATIENT
Start: 2021-01-01

## 2021-01-01 RX ORDER — DIPHENHYDRAMINE HYDROCHLORIDE 50 MG/ML
50 INJECTION INTRAMUSCULAR; INTRAVENOUS ONCE
Status: CANCELLED | OUTPATIENT
Start: 2021-03-01

## 2021-01-01 RX ORDER — SODIUM CHLORIDE 0.9 % (FLUSH) 0.9 %
5 SYRINGE (ML) INJECTION PRN
Status: CANCELLED | OUTPATIENT
Start: 2021-01-01

## 2021-01-01 RX ORDER — DIPHENHYDRAMINE HYDROCHLORIDE 50 MG/ML
50 INJECTION INTRAMUSCULAR; INTRAVENOUS ONCE
Status: COMPLETED | OUTPATIENT
Start: 2021-01-01 | End: 2021-01-01

## 2021-01-01 RX ORDER — SODIUM CHLORIDE 0.9 % (FLUSH) 0.9 %
5 SYRINGE (ML) INJECTION PRN
Status: CANCELLED | OUTPATIENT
Start: 2021-03-01

## 2021-01-01 RX ORDER — DIPHENHYDRAMINE HYDROCHLORIDE 50 MG/ML
50 INJECTION INTRAMUSCULAR; INTRAVENOUS ONCE
Status: CANCELLED | OUTPATIENT
Start: 2021-03-22 | End: 2021-03-22

## 2021-01-01 RX ORDER — 0.9 % SODIUM CHLORIDE 0.9 %
500 INTRAVENOUS SOLUTION INTRAVENOUS ONCE
Status: DISCONTINUED | OUTPATIENT
Start: 2021-01-01 | End: 2021-01-01

## 2021-01-01 RX ORDER — SODIUM CHLORIDE 9 MG/ML
INJECTION, SOLUTION INTRAVENOUS CONTINUOUS
Status: CANCELLED | OUTPATIENT
Start: 2021-01-01

## 2021-01-01 RX ORDER — METHYLPREDNISOLONE SODIUM SUCCINATE 125 MG/2ML
125 INJECTION, POWDER, LYOPHILIZED, FOR SOLUTION INTRAMUSCULAR; INTRAVENOUS ONCE
Status: CANCELLED | OUTPATIENT
Start: 2021-03-01 | End: 2021-03-01

## 2021-01-01 RX ORDER — ACETAMINOPHEN 325 MG/1
650 TABLET ORAL EVERY 6 HOURS PRN
Status: DISCONTINUED | OUTPATIENT
Start: 2021-01-01 | End: 2021-01-01 | Stop reason: HOSPADM

## 2021-01-01 RX ORDER — ACETAMINOPHEN 650 MG/1
650 SUPPOSITORY RECTAL EVERY 6 HOURS PRN
Status: DISCONTINUED | OUTPATIENT
Start: 2021-01-01 | End: 2021-01-01 | Stop reason: HOSPADM

## 2021-01-01 RX ORDER — FENTANYL CITRATE 50 UG/ML
INJECTION, SOLUTION INTRAMUSCULAR; INTRAVENOUS
Status: COMPLETED | OUTPATIENT
Start: 2021-01-01 | End: 2021-01-01

## 2021-01-01 RX ORDER — OMEPRAZOLE 20 MG/1
20 CAPSULE, DELAYED RELEASE ORAL DAILY
Qty: 30 CAPSULE | Refills: 3 | Status: SHIPPED | OUTPATIENT
Start: 2021-01-01 | End: 2021-01-01

## 2021-01-01 RX ORDER — MIDAZOLAM HYDROCHLORIDE 2 MG/2ML
INJECTION, SOLUTION INTRAMUSCULAR; INTRAVENOUS
Status: COMPLETED | OUTPATIENT
Start: 2021-01-01 | End: 2021-01-01

## 2021-01-01 RX ORDER — PALONOSETRON 0.05 MG/ML
0.25 INJECTION, SOLUTION INTRAVENOUS ONCE
Status: CANCELLED | OUTPATIENT
Start: 2021-03-01

## 2021-01-01 RX ORDER — LABETALOL HYDROCHLORIDE 5 MG/ML
20 INJECTION, SOLUTION INTRAVENOUS
Status: DISCONTINUED | OUTPATIENT
Start: 2021-01-01 | End: 2021-01-01 | Stop reason: HOSPADM

## 2021-01-01 RX ORDER — LEVOFLOXACIN 500 MG/1
500 TABLET, FILM COATED ORAL DAILY
Qty: 5 TABLET | Refills: 0 | Status: SHIPPED | OUTPATIENT
Start: 2021-01-01 | End: 2021-02-11

## 2021-01-01 RX ORDER — CARVEDILOL 6.25 MG/1
6.25 TABLET ORAL 2 TIMES DAILY WITH MEALS
Qty: 60 TABLET | Refills: 3 | Status: SHIPPED | OUTPATIENT
Start: 2021-01-01

## 2021-01-01 RX ORDER — SODIUM CHLORIDE 9 MG/ML
20 INJECTION, SOLUTION INTRAVENOUS ONCE
Status: CANCELLED | OUTPATIENT
Start: 2021-01-01 | End: 2021-01-01

## 2021-01-01 RX ORDER — EPINEPHRINE 1 MG/ML
0.3 INJECTION, SOLUTION, CONCENTRATE INTRAVENOUS PRN
Status: CANCELLED | OUTPATIENT
Start: 2021-01-01

## 2021-01-01 RX ORDER — PALONOSETRON 0.05 MG/ML
0.25 INJECTION, SOLUTION INTRAVENOUS ONCE
Status: COMPLETED | OUTPATIENT
Start: 2021-01-01 | End: 2021-01-01

## 2021-01-01 RX ORDER — HEPARIN SODIUM (PORCINE) LOCK FLUSH IV SOLN 100 UNIT/ML 100 UNIT/ML
500 SOLUTION INTRAVENOUS PRN
Status: DISCONTINUED | OUTPATIENT
Start: 2021-01-01 | End: 2021-01-01 | Stop reason: HOSPADM

## 2021-01-01 RX ORDER — MAGNESIUM SULFATE 1 G/100ML
1000 INJECTION INTRAVENOUS PRN
Status: DISCONTINUED | OUTPATIENT
Start: 2021-01-01 | End: 2021-01-01 | Stop reason: HOSPADM

## 2021-01-01 RX ORDER — PALONOSETRON 0.05 MG/ML
0.25 INJECTION, SOLUTION INTRAVENOUS ONCE
Status: CANCELLED | OUTPATIENT
Start: 2021-01-01

## 2021-01-01 RX ORDER — HEPARIN SODIUM (PORCINE) LOCK FLUSH IV SOLN 100 UNIT/ML 100 UNIT/ML
500 SOLUTION INTRAVENOUS PRN
Status: CANCELLED | OUTPATIENT
Start: 2021-03-01

## 2021-01-01 RX ORDER — SODIUM CHLORIDE 0.9 % (FLUSH) 0.9 %
5 SYRINGE (ML) INJECTION PRN
Status: CANCELLED | OUTPATIENT
Start: 2021-03-22

## 2021-01-01 RX ORDER — NICOTINE 21 MG/24HR
1 PATCH, TRANSDERMAL 24 HOURS TRANSDERMAL DAILY PRN
Status: DISCONTINUED | OUTPATIENT
Start: 2021-01-01 | End: 2021-01-01 | Stop reason: HOSPADM

## 2021-01-01 RX ORDER — SODIUM CHLORIDE 0.9 % (FLUSH) 0.9 %
10 SYRINGE (ML) INJECTION PRN
Status: CANCELLED | OUTPATIENT
Start: 2021-03-22

## 2021-01-01 RX ORDER — SODIUM CHLORIDE 9 MG/ML
20 INJECTION, SOLUTION INTRAVENOUS ONCE
Status: CANCELLED | OUTPATIENT
Start: 2021-03-01 | End: 2021-03-01

## 2021-01-01 RX ORDER — METHYLPREDNISOLONE SODIUM SUCCINATE 125 MG/2ML
125 INJECTION, POWDER, LYOPHILIZED, FOR SOLUTION INTRAMUSCULAR; INTRAVENOUS ONCE
Status: CANCELLED | OUTPATIENT
Start: 2021-03-22 | End: 2021-03-22

## 2021-01-01 RX ORDER — OMEPRAZOLE 20 MG/1
20 CAPSULE, DELAYED RELEASE ORAL DAILY
Qty: 90 CAPSULE | Refills: 3 | Status: SHIPPED | OUTPATIENT
Start: 2021-01-01

## 2021-01-01 RX ORDER — LABETALOL HYDROCHLORIDE 5 MG/ML
10 INJECTION, SOLUTION INTRAVENOUS ONCE
Status: COMPLETED | OUTPATIENT
Start: 2021-01-01 | End: 2021-01-01

## 2021-01-01 RX ORDER — ALBUMIN (HUMAN) 12.5 G/50ML
100 SOLUTION INTRAVENOUS ONCE
Status: COMPLETED | OUTPATIENT
Start: 2021-01-01 | End: 2021-01-01

## 2021-01-01 RX ORDER — SODIUM CHLORIDE 9 MG/ML
INJECTION, SOLUTION INTRAVENOUS CONTINUOUS
Status: CANCELLED | OUTPATIENT
Start: 2021-03-22

## 2021-01-01 RX ORDER — ONDANSETRON 2 MG/ML
4 INJECTION INTRAMUSCULAR; INTRAVENOUS EVERY 6 HOURS PRN
Status: DISCONTINUED | OUTPATIENT
Start: 2021-01-01 | End: 2021-01-01 | Stop reason: HOSPADM

## 2021-01-01 RX ORDER — METHYLPREDNISOLONE SODIUM SUCCINATE 125 MG/2ML
125 INJECTION, POWDER, LYOPHILIZED, FOR SOLUTION INTRAMUSCULAR; INTRAVENOUS ONCE
Status: CANCELLED | OUTPATIENT
Start: 2021-01-01 | End: 2021-01-01

## 2021-01-01 RX ORDER — CARVEDILOL 6.25 MG/1
6.25 TABLET ORAL 2 TIMES DAILY WITH MEALS
Status: DISCONTINUED | OUTPATIENT
Start: 2021-01-01 | End: 2021-01-01 | Stop reason: HOSPADM

## 2021-01-01 RX ORDER — SODIUM CHLORIDE 9 MG/ML
20 INJECTION, SOLUTION INTRAVENOUS ONCE
Status: CANCELLED | OUTPATIENT
Start: 2021-03-22 | End: 2021-03-22

## 2021-01-01 RX ORDER — 0.9 % SODIUM CHLORIDE 0.9 %
250 INTRAVENOUS SOLUTION INTRAVENOUS ONCE
Status: COMPLETED | OUTPATIENT
Start: 2021-01-01 | End: 2021-01-01

## 2021-01-01 RX ORDER — SODIUM CHLORIDE 0.9 % (FLUSH) 0.9 %
10 SYRINGE (ML) INJECTION PRN
Status: CANCELLED | OUTPATIENT
Start: 2021-01-01

## 2021-01-01 RX ORDER — DIPHENHYDRAMINE HYDROCHLORIDE 50 MG/ML
50 INJECTION INTRAMUSCULAR; INTRAVENOUS ONCE
Status: CANCELLED | OUTPATIENT
Start: 2021-03-22

## 2021-01-01 RX ORDER — SODIUM CHLORIDE 9 MG/ML
20 INJECTION, SOLUTION INTRAVENOUS ONCE
Status: COMPLETED | OUTPATIENT
Start: 2021-01-01 | End: 2021-01-01

## 2021-01-01 RX ORDER — DIPHENHYDRAMINE HYDROCHLORIDE 50 MG/ML
50 INJECTION INTRAMUSCULAR; INTRAVENOUS ONCE
Status: CANCELLED | OUTPATIENT
Start: 2021-01-01

## 2021-01-01 RX ADMIN — DIPHENHYDRAMINE HYDROCHLORIDE 50 MG: 50 INJECTION, SOLUTION INTRAMUSCULAR; INTRAVENOUS at 11:54

## 2021-01-01 RX ADMIN — FAMOTIDINE 20 MG: 20 TABLET, FILM COATED ORAL at 11:43

## 2021-01-01 RX ADMIN — CEFTRIAXONE SODIUM 1000 MG: 1 INJECTION, POWDER, FOR SOLUTION INTRAMUSCULAR; INTRAVENOUS at 15:30

## 2021-01-01 RX ADMIN — HEPARIN 500 UNITS: 100 SYRINGE at 17:40

## 2021-01-01 RX ADMIN — SODIUM CHLORIDE 80 ML: 9 INJECTION, SOLUTION INTRAVENOUS at 14:27

## 2021-01-01 RX ADMIN — SODIUM CHLORIDE, PRESERVATIVE FREE 10 ML: 5 INJECTION INTRAVENOUS at 17:40

## 2021-01-01 RX ADMIN — SODIUM CHLORIDE: 9 INJECTION, SOLUTION INTRAVENOUS at 09:56

## 2021-01-01 RX ADMIN — SODIUM CHLORIDE, PRESERVATIVE FREE 10 ML: 5 INJECTION INTRAVENOUS at 19:52

## 2021-01-01 RX ADMIN — FOSAPREPITANT 150 MG: 150 INJECTION, POWDER, LYOPHILIZED, FOR SOLUTION INTRAVENOUS at 12:21

## 2021-01-01 RX ADMIN — FENTANYL CITRATE 50 MCG: 50 INJECTION, SOLUTION INTRAMUSCULAR; INTRAVENOUS at 13:10

## 2021-01-01 RX ADMIN — IOPAMIDOL 75 ML: 755 INJECTION, SOLUTION INTRAVENOUS at 14:26

## 2021-01-01 RX ADMIN — HEPARIN 5 ML: 100 SYRINGE at 13:26

## 2021-01-01 RX ADMIN — Medication 10 MG: at 01:08

## 2021-01-01 RX ADMIN — CARVEDILOL 3.12 MG: 3.12 TABLET, FILM COATED ORAL at 17:04

## 2021-01-01 RX ADMIN — SODIUM CHLORIDE, PRESERVATIVE FREE 10 ML: 5 INJECTION INTRAVENOUS at 08:27

## 2021-01-01 RX ADMIN — METOPROLOL TARTRATE 5 MG: 1 INJECTION, SOLUTION INTRAVENOUS at 22:40

## 2021-01-01 RX ADMIN — FAMOTIDINE 20 MG: 20 TABLET, FILM COATED ORAL at 08:12

## 2021-01-01 RX ADMIN — ALBUTEROL SULFATE 2.5 MG: 2.5 SOLUTION RESPIRATORY (INHALATION) at 11:13

## 2021-01-01 RX ADMIN — HYDRALAZINE HYDROCHLORIDE 10 MG: 20 INJECTION INTRAMUSCULAR; INTRAVENOUS at 08:12

## 2021-01-01 RX ADMIN — IOPAMIDOL 75 ML: 755 INJECTION, SOLUTION INTRAVENOUS at 08:14

## 2021-01-01 RX ADMIN — FAMOTIDINE 20 MG: 20 TABLET, FILM COATED ORAL at 09:56

## 2021-01-01 RX ADMIN — DEXTROSE, SODIUM CHLORIDE, AND POTASSIUM CHLORIDE: 5; .45; .15 INJECTION INTRAVENOUS at 10:13

## 2021-01-01 RX ADMIN — HYDRALAZINE HYDROCHLORIDE 10 MG: 20 INJECTION INTRAMUSCULAR; INTRAVENOUS at 00:01

## 2021-01-01 RX ADMIN — CARVEDILOL 6.25 MG: 6.25 TABLET, FILM COATED ORAL at 08:29

## 2021-01-01 RX ADMIN — CEFTRIAXONE SODIUM 1000 MG: 1 INJECTION, POWDER, FOR SOLUTION INTRAMUSCULAR; INTRAVENOUS at 15:32

## 2021-01-01 RX ADMIN — Medication 20 MG: at 04:16

## 2021-01-01 RX ADMIN — DEXAMETHASONE SODIUM PHOSPHATE 10 MG: 10 INJECTION INTRAMUSCULAR; INTRAVENOUS at 11:54

## 2021-01-01 RX ADMIN — CARBOPLATIN 535 MG: 10 INJECTION INTRAVENOUS at 16:34

## 2021-01-01 RX ADMIN — PACLITAXEL 342 MG: 6 INJECTION, SOLUTION, CONCENTRATE INTRAVENOUS at 13:10

## 2021-01-01 RX ADMIN — SODIUM CHLORIDE, PRESERVATIVE FREE 10 ML: 5 INJECTION INTRAVENOUS at 20:09

## 2021-01-01 RX ADMIN — ENOXAPARIN SODIUM 90 MG: 100 INJECTION SUBCUTANEOUS at 18:18

## 2021-01-01 RX ADMIN — ENOXAPARIN SODIUM 40 MG: 40 INJECTION SUBCUTANEOUS at 07:29

## 2021-01-01 RX ADMIN — CARVEDILOL 6.25 MG: 6.25 TABLET, FILM COATED ORAL at 16:37

## 2021-01-01 RX ADMIN — FAMOTIDINE 20 MG: 20 TABLET, FILM COATED ORAL at 08:02

## 2021-01-01 RX ADMIN — SODIUM CHLORIDE, PRESERVATIVE FREE 10 ML: 5 INJECTION INTRAVENOUS at 19:47

## 2021-01-01 RX ADMIN — SODIUM CHLORIDE 250 ML: 0.9 INJECTION, SOLUTION INTRAVENOUS at 12:54

## 2021-01-01 RX ADMIN — Medication 10 ML: at 14:27

## 2021-01-01 RX ADMIN — MIDAZOLAM HYDROCHLORIDE 1 MG: 1 INJECTION, SOLUTION INTRAMUSCULAR; INTRAVENOUS at 13:10

## 2021-01-01 RX ADMIN — CARVEDILOL 6.25 MG: 6.25 TABLET, FILM COATED ORAL at 08:12

## 2021-01-01 RX ADMIN — SODIUM CHLORIDE, PRESERVATIVE FREE 10 ML: 5 INJECTION INTRAVENOUS at 08:15

## 2021-01-01 RX ADMIN — CARVEDILOL 6.25 MG: 6.25 TABLET, FILM COATED ORAL at 09:56

## 2021-01-01 RX ADMIN — ALBUMIN (HUMAN) 100 G: 0.25 INJECTION, SOLUTION INTRAVENOUS at 12:12

## 2021-01-01 RX ADMIN — SODIUM CHLORIDE 20 ML/HR: 9 INJECTION, SOLUTION INTRAVENOUS at 11:00

## 2021-01-01 RX ADMIN — FAMOTIDINE 20 MG: 10 INJECTION INTRAVENOUS at 11:54

## 2021-01-01 RX ADMIN — PALONOSETRON 0.25 MG: 0.05 INJECTION, SOLUTION INTRAVENOUS at 11:54

## 2021-01-01 RX ADMIN — SODIUM CHLORIDE, PRESERVATIVE FREE 10 ML: 5 INJECTION INTRAVENOUS at 07:29

## 2021-01-01 RX ADMIN — CARVEDILOL 6.25 MG: 6.25 TABLET, FILM COATED ORAL at 16:26

## 2021-01-01 ASSESSMENT — ENCOUNTER SYMPTOMS
BLOOD IN STOOL: 0
COUGH: 0
DIARRHEA: 0
CONSTIPATION: 0
VOMITING: 0
VOMITING: 0
CONSTIPATION: 0
ABDOMINAL DISTENTION: 1
WHEEZING: 1
SHORTNESS OF BREATH: 1
RHINORRHEA: 0
SHORTNESS OF BREATH: 1
ABDOMINAL PAIN: 0
NAUSEA: 0
COLOR CHANGE: 0
WHEEZING: 0
NAUSEA: 0
CONSTIPATION: 0
WHEEZING: 0
SHORTNESS OF BREATH: 1
SHORTNESS OF BREATH: 1
WHEEZING: 0
NAUSEA: 0
ABDOMINAL PAIN: 0
VOMITING: 0
VOMITING: 0
BLOOD IN STOOL: 0
DIARRHEA: 0
VOMITING: 0
ABDOMINAL PAIN: 0
SHORTNESS OF BREATH: 1
CHEST TIGHTNESS: 0
DIARRHEA: 0
CONSTIPATION: 0
CHEST TIGHTNESS: 0
SORE THROAT: 0
BLOOD IN STOOL: 0
NAUSEA: 0
NAUSEA: 0
ABDOMINAL PAIN: 0
WHEEZING: 0
CHEST TIGHTNESS: 0
SINUS PRESSURE: 0
CONSTIPATION: 0
BLOOD IN STOOL: 0
CHEST TIGHTNESS: 0
COUGH: 0
ABDOMINAL DISTENTION: 1
COUGH: 0
DIARRHEA: 0
COUGH: 0
ABDOMINAL PAIN: 0
DIARRHEA: 1
ABDOMINAL DISTENTION: 1

## 2021-01-01 ASSESSMENT — PAIN SCALES - GENERAL
PAINLEVEL_OUTOF10: 0

## 2021-02-02 PROBLEM — R19.00 PELVIC MASS: Status: ACTIVE | Noted: 2021-01-01

## 2021-02-02 NOTE — ED PROVIDER NOTES
The patient was seen and examined by me in conjunction with the mid-level provider. I agree with his/her assessment and treatment plan. CAT scan findings are discussed with the patient and her  and she is being transferred to [place] for appropriate care.      Jagdeep Garcia MD  02/02/21 0506

## 2021-02-02 NOTE — TELEPHONE ENCOUNTER
Dr. Foster Wright called in alerting writer to pt's case. Dr. Foster Wright stated pt @ [facility] & will be transferred to [facility]. Dr. Foster Wright stated [place] referral will be placed. Writer will follow.

## 2021-02-02 NOTE — ED NOTES
Pt given ice chips per Bethany and cool, wet washcloth for comfort.      Mckay Alegria RN  02/02/21 8613

## 2021-02-03 PROBLEM — R79.89 ELEVATED D-DIMER: Status: ACTIVE | Noted: 2021-01-01

## 2021-02-03 PROBLEM — C56.2 MALIGNANT NEOPLASM OF LEFT OVARY (HCC): Status: ACTIVE | Noted: 2021-01-01

## 2021-02-03 PROBLEM — H91.90 HEARING IMPAIRED: Status: ACTIVE | Noted: 2021-01-01

## 2021-02-03 PROBLEM — J96.01 ACUTE HYPOXEMIC RESPIRATORY FAILURE (HCC): Status: ACTIVE | Noted: 2021-01-01

## 2021-02-03 PROBLEM — J90 PLEURAL EFFUSION, BILATERAL: Status: ACTIVE | Noted: 2021-01-01

## 2021-02-03 PROBLEM — R18.8 ABDOMINAL ASCITES: Status: ACTIVE | Noted: 2021-01-01

## 2021-02-03 PROBLEM — I71.20 THORACIC AORTIC ANEURYSM WITHOUT RUPTURE: Status: ACTIVE | Noted: 2021-01-01

## 2021-02-03 PROBLEM — R09.02 HYPOXIA: Status: ACTIVE | Noted: 2021-01-01

## 2021-02-03 NOTE — CONSULTS
[address]    Patient's Name/ Date of Birth/ Gender: Rui Escobedo / [birth year] (72 y.o.) / female     Referring Physician: Amanda Orantes MD    Consulting Physician: Dr. Rach Panda     History of present Illness: Pt is a 72 y.o. female with PMH including HLD,  HTN, thyroid disease, hearing impairment who presented to [facility] due to weakness reporting she has had multiple falls in December with difficulty walking and progressive weakness. Says she has had abdominal bloating for 2 weeks. Also endorses shortness of breath. CT a/p performed at outlying facility revealed a R sided pelvic mass (6.3x5.1cm). There is an additional  L sided pelvic mass (13.1x5.4cm). Bilateral pleural effusions are present. A 5.6 cm thoracic aortic aneurysm was also found and vascular surgery was consulted for recommendations. She enies prior abdominal surgeries. Past Medical History:  has a past medical history of Hyperlipidemia, Hypertension, and Thyroid disease. Past Surgical History: No past surgical history on file. Social History:  reports that she has quit smoking. She does not have any smokeless tobacco history on file. She reports previous alcohol use. She reports that she does not use drugs. Family History: family history is not on file. Review of Systems:   General: Endorses fatigue, weakness   HEENT: Denies sore throat, vision changes, endorses   Card: Denies chest pain, palpitations  Pulm: Admits to SOB, Denies cough  GI: Admits to abdominal bloating. Denies abdominal pain, emesis   : Denies polyuria, dysuria  Endo: Denies polydipsia, heat/cold intolerance   Heme: Denies bleeding or bruising issues   Neuro: Denies h/o CVA, TIA  Skin: Denies rashes, ulcers  Musculoskeletal: Denies muscle, joint, back pain. Allergies: Patient has no known allergies.     Current Meds:  Current Facility-Administered Medications:     hydrALAZINE (APRESOLINE) injection 10 mg, 10 mg, Intravenous, Q6H PRN, Willis Sat, APRN - NP, 10 mg at 02/03/21 0001    sodium chloride flush 0.9 % injection 10 mL, 10 mL, Intravenous, 2 times per day, Willis Sat, APRN - NP    sodium chloride flush 0.9 % injection 10 mL, 10 mL, Intravenous, PRN, Willis Sat, APRN - NP    potassium chloride (KLOR-CON M) extended release tablet 40 mEq, 40 mEq, Oral, PRN **OR** potassium bicarb-citric acid (EFFER-K) effervescent tablet 40 mEq, 40 mEq, Oral, PRN **OR** potassium chloride 10 mEq/100 mL IVPB (Peripheral Line), 10 mEq, Intravenous, PRN, Willis Sat, APRN - NP    magnesium sulfate 1000 mg in dextrose 5% 100 mL IVPB, 1,000 mg, Intravenous, PRN, Willis Sat, APRN - NP  [facility]  [START ON 2/4/2021] enoxaparin (LOVENOX) injection 40 mg, 40 mg, Subcutaneous, Daily, Willis Sat, APRN - NP    promethazine (PHENERGAN) tablet 12.5 mg, 12.5 mg, Oral, Q6H PRN **OR** ondansetron (ZOFRAN) injection 4 mg, 4 mg, Intravenous, Q6H PRN, Willis Sat, APRN - NP    polyethylene glycol (GLYCOLAX) packet 17 g, 17 g, Oral, Daily PRN, Willis Sat, APRN - NP    nicotine (NICODERM CQ) 21 MG/24HR 1 patch, 1 patch, Transdermal, Daily PRN, Willis Sat, APRN - NP    acetaminophen (TYLENOL) tablet 650 mg, 650 mg, Oral, Q6H PRN **OR** acetaminophen (TYLENOL) suppository 650 mg, 650 mg, Rectal, Q6H PRN, Willis Sat, APRN - NP    famotidine (PEPCID) tablet 20 mg, 20 mg, Oral, Daily, Willis Sat, APRN - NP    Vital Signs:  Vitals:    02/02/21 2313   BP: (!) 162/111   Pulse: 109   Resp: 21   Temp: 98.6 °F (37 °C)       Physical Exam:  Gen:  A&Ox3, NAD  HEENT: Pupils equal, EOMI   Neck: Supple, trachea midline   CVS: Tachycardic rate, regular rhythm   Resp: Normal effort on NC, no respiratory distress, no accessory muscle use   Abd: soft with significant distention, nontender, nonperitoneal   Ext: No clubbing, cyanosis, edema   CNS: Moves all extremities, no gross focal motor deficits  Skin: No erythema or ulcerations     Labs:   Lab Results Component Value Date    WBC 10.0 02/02/2021    HGB 15.4 02/02/2021    HCT 50.1 02/02/2021    MCV 78.9 02/02/2021     02/02/2021     10/04/2011     Lab Results   Component Value Date     02/02/2021    K 4.2 02/02/2021    CL 94 02/02/2021    CO2 27 02/02/2021    BUN 22 02/02/2021    CREATININE 1.02 02/02/2021    GLUCOSE 125 02/02/2021    GLUCOSE 105 10/04/2011    CALCIUM 8.8 02/02/2021     Lab Results   Component Value Date    INR 1.7 02/02/2021       Imaging:  CT a/p with IV contrast  FINDINGS:   Lower Chest: Moderate-large dependent pleural effusions greater on the right   are present with adjacent compressive atelectasis.  There is aneurysm of the   descending thoracic aorta only partially imaged, measuring at least up to 5.6   cm       Organs: No acute abnormality of the organs of the abdomen. No evidence of   pancreatitis.  No ureteral stone or hydronephrosis. No evidence of   pyelonephritis.       GI/Bowel: No bowel obstruction or other acute process demonstrated.  No   evidence of colitis, diverticulitis or appendicitis. Michele Bethune is diffuse   colonic diverticulosis.       Pelvis: There is a mixed cystic and solid mass in the right-sided pelvis   likely arising from the ovary measuring 6.3 x 5.1 cm on axial image 165.  In   the left-sided pelvis there is a mixed density elongated mass which includes   fat density.  This measures 13.1 x 5.4 cm on axial image 173.  Peritoneal   nodularity and possible adjacent smaller masses compressed against each other   are also present throughout the pelvis.       Peritoneum/Retroperitoneum: Peritoneal nodularity and omental nodularity with   thickening.  Extensive ascites.  No free air.       Bones/Soft Tissues: No fracture or other acute osseous process. Impression:  Patient Active Problem List   Diagnosis    Pelvic mass     AAA  -5.6cm     Recommendation:    1. Patient with incidental 5.6cm AAA found on CT imaging.  This may require endovascular intervention considering risk of rupture if patient is agreeable. However, considering patient's other acute issues and the possibility of malignant ascites would recommend further malignancy workup prior to pursuing vascular intervention for her aortic aneurysm. 2. Will follow results of thoracentesis and paracentesis and gynecology team recommendations. Vascular surgery will continue to follow, further recommendations to follow pending above.        Estee Obregon, DO  General Surgery PGY-2

## 2021-02-03 NOTE — PROGRESS NOTES
Pt seen and examined at bedside this morning. Pt's sister and  at bedside. I discussed her diagnosis of thoracic aortic aneurysm. I drew pictures and discussed the location and management of her aneurysm. Pt with pelvic tumors noted on CT and large amount of likely malignant ascites. Gyn/Onc following. Discussed at length with the pt and her family that we will monitor her aneurysm at this time. Pt is asymptomatic. Her risk of rupture is less than 10 % over the next 2 years regarding the size of her aneurysm. No acute vascular intervention at this time. All questions from family answered at bedside. Continue medical management per the primary team. Please call or page with any questions. F/U in the clinic with Dr. Lakeshia Pennington in 6 months for repeat imaging.      Electronically signed by Vince Stapleton DO on 2/3/2021 at 12:11 PM

## 2021-02-03 NOTE — PROGRESS NOTES
PT HAD BILATERAL THORACENTESIS TODAY. 700 ML FROM RIGHT, 450 ML FROM LEFT OF PLEURAL FLUID COLLECTED. 2X2 GAUZE AND TEGADERM TO CATHETER SITES. DRY AND INTACT. SPECIMEN SENT. PT TOLERATED WELL. VSS. REPORT TO FLOOR. READY FOR TRANSPORT TO VASCULAR LAB.

## 2021-02-03 NOTE — CONSULTS
Today's Date: 2/3/2021  Patient Name: Georgi Rodriguez  Date of admission: 2/2/2021 11:17 PM  Patient's age: 72 y.o., 1950  Admission Dx: Pelvic mass [R19.00]    Reason for Consult: management recommendations  Requesting Physician: Cynthia Mina MD    CHIEF COMPLAINT:  Ovarian mass, ascites     History Obtained From:  patient    HISTORY OF PRESENT ILLNESS:      The patient is a 72 y.o.  female who is admitted to the hospital for rest of weakness and abdominal distention. She underwent CT scan that showed abdominal carcinomatosis with ascites and pleural effusion as well as omental caking and large pelvic mass  The patient underwent pleural tap today and we are awaiting results. The patient is deaf and all information were obtained from the patient through an . Family is at bedside and also participating in the conversation. Seems that she has been suffering from worsening abdominal discomfort and progressive weakness over the last few months. The patient did not see any medical doctor for the last 5 years    Past Medical History:   has a past medical history of Hyperlipidemia, Hypertension, and Thyroid disease. Past Surgical History:   has no past surgical history on file. Medications:    Reviewed in Epic     Allergies:  Patient has no known allergies. Social History:   reports that she has quit smoking. She does not have any smokeless tobacco history on file. She reports previous alcohol use. She reports that she does not use drugs. Family History: The mother had breast cancer and that the father had bladder cancer  REVIEW OF SYSTEMS: Difficult to obtain. Progressive weakness, abdominal distention  Constitutional: No fever or chills. No night sweats, weight gain and weakness.   Eyes: No eye discharge, double vision, or eye pain   HEENT: negative for sore mouth, sore throat, hoarseness and voice change   Respiratory: negative for cough , sputum, dyspnea, wheezing, hemoptysis, chest pain   Cardiovascular: negative for chest pain, dyspnea, palpitations, orthopnea, PND   Gastrointestinal: Abdominal discomfort, abdominal distention  Genitourinary: negative for frequency, dysuria, nocturia, urinary incontinence, and hematuria   Integument: negative for rash, skin lesions, bruises.    Hematologic/Lymphatic: negative for easy bruising, bleeding, lymphadenopathy, or petechiae   Endocrine: negative for heat or cold intolerance,weight changes, change in bowel habits and hair loss   Musculoskeletal: negative for myalgias, arthralgias, pain, joint swelling,and bone pain   Neurological: negative for headaches, dizziness, seizures, weakness, numbness    PHYSICAL EXAM:      BP (!) 142/87   Pulse 101   Temp 98.4 °F (36.9 °C) (Oral)   Resp 27   Wt 209 lb 7 oz (95 kg)   SpO2 96%    Temp (24hrs), Av.5 °F (36.9 °C), Min:98.4 °F (36.9 °C), Max:98.6 °F (37 °C)    General appearance -ill-appearing female  Mental status - alert and cooperative   Eyes - pupils equal and reactive, extraocular eye movements intact   Ears - bilateral TM's and external ear canals normal   Mouth - mucous membranes moist, pharynx normal without lesions   Neck - supple, no significant adenopathy   Lymphatics - no palpable lymphadenopathy, no hepatosplenomegaly   Chest -creased air entry with both bases  Heart - normal rate, regular rhythm, normal S1, S2, no murmurs  Abdomen -distended with ascites  Neurological - alert, oriented, normal speech, no focal findings or movement disorder noted   Musculoskeletal - no joint tenderness, deformity or swelling   Extremities - peripheral pulses normal, no pedal edema, no clubbing or cyanosis   Skin - normal coloration and turgor, no rashes, no suspicious skin lesions noted ,    DATA:    Labs:   CBC:   Recent Labs     21  1300 21  0712   WBC 10.0 6.6   HGB 15.4* 13.9   HCT 50.1* 45.8    324     BMP:   Recent Labs     21  1300 21  0712    135 K 4.2 4.0   CO2 27 26   BUN 22 22   CREATININE 1.02* 0.81   LABGLOM 54* >60   GLUCOSE 125* 90     PT/INR:   Recent Labs     02/02/21  1300 02/03/21  0712   PROTIME 19.4* 18.8*   INR 1.7 1.8       IMAGING DATA:      Primary Problem  Pelvic mass    Active Hospital Problems    Diagnosis Date Noted    Pleural effusion, bilateral [J90] 02/03/2021    Abdominal ascites [R18.8] 02/03/2021    Thoracic aortic aneurysm without rupture ([address] [I71.2] 02/03/2021    Hearing impaired [H91.90] 02/03/2021    Hypoxia [R09.02] 02/03/2021    Malignant neoplasm of left ovary (HCC) [C56.2] 02/03/2021    Pelvic mass [R19.00] 02/02/2021         IMPRESSION:   1. Pelvic mass  2. Abdominal ascites and carcinomatosis  3. Pleural effusion  4. The picture is highly suggestive of with advanced ovarian cancer    RECOMMENDATIONS:  1. I had a long discussion with the patient and her family. Explaining the imaging study and the fact that her cancer marker is elevated. Pleural tap was done and we are waiting for cytology, unfortunately I am highly suspicious that she has Metastatic ovarian cancer  2. The plan was discussed with GYN oncology, will plan for chemotherapy with carboplatin and Taxol to be followed likely by debulking surgery  3. Considering her family history, I am highly suspicious that she has a genetic disorder. We will arrange for genetic counselor to see her as an outpatient  4. Chemotherapy schedule and side effects were briefly explained. Patient will need a Mediport as an outpatient  5. We will follow closely with you      Discussed with patient and Nurse. Thank you for asking us to see this patient.     JESS PITTMAN [facility] MD Sandy  Hematologist/Medical Oncologist  Cell: [phone]

## 2021-02-03 NOTE — H&P
[facility]  Office: [address], DO, Eugenia Ormond, DO, Lanageneva Lima, DO, Carmita Sho Hayes, DO, Anum Perez MD, Gaby Carlson MD, Rich Rouse MD, Zoltan Galvin MD, [place] Dominguez MD, Asuncion Lynch MD, Peter Vallecillo MD, Sharyle Haff, MD, Juno Fischer MD, Peng Steele DO, Sadie Pretty MD, Ciara Moore MD, Saman Abarca DO, Bea Quintana MD,  Priti Cedeno DO, Ray Lopes MD, Venice Jimenez MD, Richi Webster [facility], [facility], CNP, Nick Adam, CNP, Leah Montez, CNS, Bailey Romero, CNP, Tara Plascencia, CNP, Jessica Dunham, CNP, Marius Spatz, CNP, Anthony Sutton, CNP, Akilah Ashby PA-C, Zakiya Noonan, PAVEL, Leela Arce, CNP, Som Tai, CNP, Bharti العلي, CNP, Ginna Gupta, CNP, Tanner Michaels, CNP         [address]    HISTORY AND PHYSICAL EXAMINATION            Date:   2/3/2021  Patient name:  Cooper Armenta  Date of admission:  2/2/2021 11:17 PM  MRN:   [id number]  Account:  [de-identified]  YOB: 1950  PCP:    No primary care provider on file. Room:   [address] [place] [address]  Code Status:    Full Code    Chief Complaint:     Dyspnea and ABD bloating    History Obtained From:     patient, electronic medical record    History of Present Illness:     Cooper Armenta is a 72 y.o. Unavailable/unknown female who presents with No chief complaint on file. and is admitted to the hospital for the management of Pelvic mass. This is a 72 yr old female with history of HTN, HLD, and Thyroid disease (off all medications for 5 yrs) who presents initially to [facility] [address] ED with generalized weakness. Patient is hearing impaired and using  to communicate. Patient endorses recent falls over the last 2-3 months. Reporting feeling like her balance has been off. She denies hitting head, traumatic injury, or LOC.   She reports 'trouble walking' due to generalized weakness over the lat 2-3 weeks. Additionally, she has noticed significant ABD bloating over 2-3 weeks with decreased appetites and increasing shortness of breath. Activity and bending over causes symptoms to worsen. She is non-oxygen dependent at baseline. Apparently has not seen a PCP in several years and stopped taking all prescription medications over the same amount of time. Has a past smoking history; quite 5 yrs ago. Denies any ABD pain or discomfort, denies pelvic or urinary complaints. She does endorse tan/brown vaginal discharge with a foul order, but is unable to pinpoint for how long this has been going on. Denies any CP, HA/dizziness, or light headedness. Denies fevers or night sweats. No recent travel or known COVID exposure, no history of DVT/PE or recent surgeries. Initial workup with concerning CTA findings: large right pelvic mass, likely ovarian in nature and likely metastatic, peritoneal and omental nodularity with omental thickening, extensive ABD ascites, large dependent pleural effusions R>L with compressive atelectasis, and incidental findings of 5.6cm descending thoracic aortic aneurysm. Pertinent labs include CRT: 1.02, LD: 410, Alk. Phos: 130, D-Dimer: 19.84, and   COVID-19: Negative. With the above findings, patient is transferred to our facility for consultation with Vascular Surgery, Gyn-Onc, Oncology, and need for thoracentesis and paracentesis. Patient was given full dose Lovenox x1 dose prior to transfer with elevated D-Dimer. Patient is admitted to [facility] for further workup and management. On my evaluation is resting in bed, hemodynamically stable on monitor with NC oxygen in place at 3L. Oxygen saturations at 94%. Using  for communication. ABD is distended with extensive ascites. Patient is dyspneic with minimal activity. Denies CP, ABD pain, n/v/d. She signs she is 'overwhelmed and anticipating her  to arrive in the morning'.   I offered to phone the  for update and answering any questions, she declined. POC explained to patient. Communicated the many concerns on imaging and need for further specialty consultation. She nods and communicates understanding. Past Medical History:     Past Medical History:   Diagnosis Date    Hyperlipidemia     Hypertension     Thyroid disease         Past Surgical History:     No past surgical history on file. Medications Prior to Admission:     Prior to Admission medications    Not on File        Allergies:     Patient has no known allergies. Social History:     Tobacco:    reports that she has quit smoking. She does not have any smokeless tobacco history on file. Alcohol:      reports previous alcohol use. Drug Use:  reports no history of drug use. Family History:     No family history on file. Review of Systems:     Positive and Negative as described in HPI. Review of Systems   Unable to perform ROS: Other (hearing impaired)   Constitutional: Positive for activity change and appetite change. Respiratory: Positive for shortness of breath and wheezing. Negative for cough and chest tightness. Cardiovascular: Negative for chest pain and palpitations. Gastrointestinal: Positive for abdominal distention and diarrhea. Negative for abdominal pain, blood in stool, constipation, nausea and vomiting. Genitourinary: Negative for difficulty urinating and hematuria. Musculoskeletal: Positive for gait problem. Generalized weakness   Skin: Positive for pallor. Neurological: Positive for weakness. Negative for dizziness, syncope, light-headedness and headaches. Psychiatric/Behavioral: Negative for behavioral problems and confusion. The patient is nervous/anxious.         Physical Exam:   BP (!) 147/88   Pulse 105   Temp 98.4 °F (36.9 °C) (Oral)   Resp 21   Wt 209 lb 7 oz (95 kg)   SpO2 96%   Temp (24hrs), Av.5 °F (36.9 °C), Min:98.4 °F (36.9 °C), Max:98.6 °F (37 °C)    No results for input(s): POCGLU in the last 72 hours. No intake or output data in the 24 hours ending 02/03/21 0420    Physical Exam  Vitals signs and nursing note reviewed. Constitutional:       General: She is in acute distress. Appearance: She is obese. She is ill-appearing. She is not toxic-appearing or diaphoretic. Comments: Hearing impaired   HENT:      Head: Normocephalic and atraumatic. Ears:      Comments: Hearing impaired, ASL for communication     Mouth/Throat:      Mouth: Mucous membranes are dry. Pharynx: Oropharynx is clear. Eyes:      Extraocular Movements: Extraocular movements intact. Conjunctiva/sclera: Conjunctivae normal.      Pupils: Pupils are equal, round, and reactive to light. Neck:      Musculoskeletal: Normal range of motion and neck supple. Cardiovascular:      Rate and Rhythm: Normal rate and regular rhythm. Heart sounds: No murmur. No friction rub. No gallop. Pulmonary:      Effort: Tachypnea present. No respiratory distress. Breath sounds: Decreased air movement present. No stridor. Wheezing present. Abdominal:      General: Bowel sounds are normal. There is distension. Tenderness: There is no abdominal tenderness. Comments: Abdominal ascites   Musculoskeletal:      Right lower leg: No edema. Left lower leg: No edema. Skin:     General: Skin is warm and dry. Capillary Refill: Capillary refill takes less than 2 seconds. Coloration: Skin is not jaundiced. Findings: No bruising. Neurological:      General: No focal deficit present. Mental Status: She is alert and oriented to person, place, and time. Mental status is at baseline. Psychiatric:         Mood and Affect: Mood normal.         Behavior: Behavior normal.         Thought Content:  Thought content normal.         Judgment: Judgment normal.         Investigations:      Laboratory Testing:  Recent Results (from the past 24 hour(s))   Hepatic Function Panel    Collection Time: 02/02/21  1:00 PM   Result Value Ref Range    Albumin 3.0 (L) 3.5 - 5.2 g/dL    Alkaline Phosphatase 134 (H) 35 - 104 U/L    ALT 8 5 - 33 U/L    AST 19 <32 U/L    Total Bilirubin 0.75 0.3 - 1.2 mg/dL    Bilirubin, Direct 0.45 (H) <0.31 mg/dL    Bilirubin, Indirect 0.30 0.00 - 1.00 mg/dL    Total Protein 8.4 (H) 6.4 - 8.3 g/dL    Globulin NOT REPORTED 1.5 - 3.8 g/dL    Albumin/Globulin Ratio NOT REPORTED 1.0 - 2.5   CBC Auto Differential    Collection Time: 02/02/21  1:00 PM   Result Value Ref Range    WBC 10.0 3.5 - 11.3 k/uL    RBC 6.35 (H) 3.95 - 5.11 m/uL    Hemoglobin 15.4 (H) 11.9 - 15.1 g/dL    Hematocrit 50.1 (H) 36.3 - 47.1 %    MCV 78.9 (L) 82.6 - 102.9 fL    MCH 24.3 (L) 25.2 - 33.5 pg    MCHC 30.7 28.4 - 34.8 g/dL    RDW 17.0 (H) 11.8 - 14.4 %    Platelets 622 663 - 415 k/uL    MPV 9.5 8.1 - 13.5 fL    NRBC Automated 0.0 0.0 per 100 WBC    Differential Type NOT REPORTED     Seg Neutrophils 75 (H) 36 - 65 %    Lymphocytes 14 (L) 24 - 43 %    Monocytes 10 3 - 12 %    Eosinophils % 0 (L) 1 - 4 %    Basophils 0 0 - 2 %    Immature Granulocytes 1 (H) 0 %    Segs Absolute 7.52 1.50 - 8.10 k/uL    Absolute Lymph # 1.42 1.10 - 3.70 k/uL    Absolute Mono # 0.98 0.10 - 1.20 k/uL    Absolute Eos # <0.03 0.00 - 0.44 k/uL    Basophils Absolute <0.03 0.00 - 0.20 k/uL    Absolute Immature Granulocyte 0.07 0.00 - 0.30 k/uL    WBC Morphology NOT REPORTED     RBC Morphology ANISOCYTOSIS PRESENT     Platelet Estimate NOT REPORTED    Basic Metabolic Panel    Collection Time: 02/02/21  1:00 PM   Result Value Ref Range    Glucose 125 (H) 70 - 99 mg/dL    BUN 22 8 - 23 mg/dL    CREATININE 1.02 (H) 0.50 - 0.90 mg/dL    Bun/Cre Ratio 22 (H) 9 - 20    Calcium 8.8 8.6 - 10.4 mg/dL    Sodium 135 135 - 144 mmol/L    Potassium 4.2 3.7 - 5.3 mmol/L    Chloride 94 (L) 98 - 107 mmol/L    CO2 27 20 - 31 mmol/L    Anion Gap 14 9 - 17 mmol/L    GFR Non-African American 54 (L) >60 mL/min    GFR African American >60 >60 mL/min    GFR Comment          GFR Staging NOT REPORTED    Lipase    Collection Time: 02/02/21  1:00 PM   Result Value Ref Range    Lipase 33 13 - 60 U/L   Amylase    Collection Time: 02/02/21  1:00 PM   Result Value Ref Range    Amylase 95 28 - 100 U/L   APTT    Collection Time: 02/02/21  1:00 PM   Result Value Ref Range    PTT 26.3 23.9 - 33.8 sec   Protime-INR    Collection Time: 02/02/21  1:00 PM   Result Value Ref Range    Protime 19.4 (H) 11.5 - 14.2 sec    INR 1.7    D-Dimer, Quantitative    Collection Time: 02/02/21  1:00 PM   Result Value Ref Range    D-Dimer, Quant 19.84 (H) 0.00 - 0.59 mg/L FEU   COVID-19    Collection Time: 02/02/21  2:33 PM    Specimen: Other   Result Value Ref Range    SARS-CoV-2          SARS-CoV-2, Rapid Not Detected Not Detected    Source . NASOPHARYNGEAL SWAB     SARS-CoV-2         D-Dimer, Quantitative    Collection Time: 02/03/21 12:20 AM   Result Value Ref Range    D-Dimer, Quant 15.02 mg/L FEU   Protein, Total    Collection Time: 02/03/21 12:20 AM   Result Value Ref Range    Total Protein 7.9 6.4 - 8.3 g/dL   Lactate Dehydrogenase    Collection Time: 02/03/21 12:20 AM   Result Value Ref Range     (H) 135 - 214 U/L       Imaging/Diagnostics:  Xr Chest (2 Vw)    Result Date: 2/2/2021  Chest: 1. Cardiomegaly with bilateral pleural effusions and bibasilar airspace disease, right greater than left. These findings likely represent CHF related changes. Underlying infiltrate not excluded. Follow-up recommended to document resolution. 2. Underlying COPD. 3. Low lung volume study. Left knee: 1. Tricompartmental osteoarthrosis. Diffuse osteopenia. 2. No acute fracture or dislocation. Xr Knee Left (3 Views)    Result Date: 2/2/2021  Chest: 1. Cardiomegaly with bilateral pleural effusions and bibasilar airspace disease, right greater than left. These findings likely represent CHF related changes. Underlying infiltrate not excluded. Follow-up recommended to document resolution.  2. Underlying COPD. 3. Low lung volume study. Left knee: 1. Tricompartmental osteoarthrosis. Diffuse osteopenia. 2. No acute fracture or dislocation. Ct Abdomen Pelvis W Iv Contrast    Result Date: 2/2/2021  Malignant mass of the pelvis likely ovarian origin, with metastatic disease throughout the peritoneal cavity of the abdomen/pelvis with peritoneal and omental nodularity and thickening, and diffuse ascites. 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 Aneurysm of the descending thoracic aorta measures up to 5.6 cm. Assessment :      Hospital Problems           Last Modified POA    * (Principal) Pelvic mass 2/3/2021 Yes    Pleural effusion, bilateral 2/3/2021 Yes    Abdominal ascites 2/3/2021 Yes    Thoracic aortic aneurysm without rupture ([address] 2/3/2021 Yes    Hearing impaired 2/3/2021 Yes          Plan:     Patient status inpatient in the Progressive Unit/Step down    1. Pelvic Mass with concern for metastatic disease: Gyn-Onc and Oncology consulted, appreciate evaluation and recommendations, plan evolving  2. ABD ascites: IR consulted, plan for paracentesis today with fluid analysis, hold lovenox this am  3. Bilateral Pleural Effusions: R>L, IR consulted, plan for thoracentesis later today with fluid analysis, hold Lovenox, continue supplemental oxygen as needed and add prn nebulizer treatment with wheezing, RT consult  4. Hypoxia: D-Dimer is elevated, she does have additional causes for hypoxia and dyspnea, however, will CT scan chest to rule out PE, [facility]  5. Descending Thoracic Aortic Aneurysm: Vascular Surgery following and appreciate consultation-- recommend further malignancy workup prior to pursuing endovascular intervention  6. Hearing Impaired: uses ASL to communicate  7.  Daily labs, patient not on any home medications, keep NPO for now  8. PPI/ DVT prophylaxis: on hold this am for paracentesis and thoracentesis  9. Full Code    Consultations:   IP CONSULT TO INTERVENTIONAL RADIOLOGY  IP CONSULT TO INTERVENTIONAL RADIOLOGY  IP CONSULT TO ONCOLOGY  IP CONSULT TO VASCULAR SURGERY  IP CONSULT TO GYNECOLOGIC ONCOLOGY    Patient is admitted as inpatient status because of co-morbidities listed above, severity of signs and symptoms as outlined, requirement for current medical therapies and most importantly because of direct risk to patient if care not provided in a hospital setting. Expected length of stay > 48 hours. ALYSSA Loja NP  2/3/2021  4:20 AM    Copy sent to Dr. Bao Momin primary care provider on file.

## 2021-02-03 NOTE — PROGRESS NOTES
Physical Therapy  DATE: 2/3/2021    NAME: Cassie Edge  MRN: [id number]   : 1950    Patient not seen this date for Physical Therapy due to:  [] Blood transfusion in progress  [] Hemodialysis  [] Patient Declined  [] Spine Precautions   [] Strict Bedrest  [] Surgery/ Procedure  [x] Testing: BLE dopplers ordered. PT will check back this PM as time allows or 21. [] Other        [] PT is being discontinued at this time. Patient independent. No further needs. [] PT is being discontinued at this time due to declining physical/ medical status. Therapy is not appropriate at this time.     Wero Cronin, PT

## 2021-02-03 NOTE — ED PROVIDER NOTES
[address] [place] [address] ED  eMERGENCY dEPARTMENT eNCOUnter      Pt Name: Anastacio Ibarra  MRN: [id number]  Lesliegfprisca 1950  Date of evaluation: 2/2/2021  Provider: Donovan Cain NP, ALYSSA Ross [id number]       Chief Complaint   Patient presents with    Fall    Knee Pain    Abdominal Pain     distention         HISTORY OF PRESENT ILLNESS  (Location/Symptom, Timing/Onset, Context/Setting, Quality, Duration, Modifying Factors, Severity.)   Anastacio Ibarra is a 72 y.o. female who presents to the emergency department by private EMS for evaluation of weakness. The patient is hearing impaired and communicates by signing which. Using an  at bedside the patient states that she had a couple falls in December. She states that since then she has had progressive weakness to the point where she is now having trouble walking. They also state that her abdomen is distended. It is gotten more bloated and more distended over the last couple of months. She states that she is having some shortness of breath with exertion. She has not seen a primary care physician in over 5 years and stopped taking all of her medications roughly 5 years ago as well. Denies any Covid concerns. Nursing Notes were reviewed. ALLERGIES     Patient has no known allergies. CURRENT MEDICATIONS       Previous Medications    No medications on file       PAST MEDICAL HISTORY         Diagnosis Date    Hyperlipidemia     Hypertension     Thyroid disease        SURGICAL HISTORY     No past surgical history on file. FAMILY HISTORY     No family history on file. No family status information on file. SOCIAL HISTORY      reports that she has quit smoking. She does not have any smokeless tobacco history on file. She reports previous alcohol use. She reports that she does not use drugs. REVIEW OF SYSTEMS    (2-9 systems for level 4, 10 or more for level 5)     Review of Systems   Constitutional: Positive for fatigue. Cervical: No cervical adenopathy. Skin:     General: Skin is warm and dry. Findings: No rash. Neurological:      Mental Status: She is alert and oriented to person, place, and time. RADIOLOGY:   Non-plain film images such as CT, Ultrasound and MRI are read by the radiologist. Plain radiographic images are visualized and preliminarily interpreted by the emergency physician with the below findings:    Xr Chest (2 Vw)    Result Date: 2/2/2021  EXAMINATION: THREE XRAY VIEWS OF THE LEFT KNEE; TWO XRAY VIEWS OF THE CHEST 2/2/2021 2:00 pm COMPARISON: PA and lateral chest from 10/03/2011 HISTORY: ORDERING SYSTEM PROVIDED HISTORY: Pain TECHNOLOGIST PROVIDED HISTORY: Pain Reason for Exam: Knee pain Acuity: Unknown Type of Exam: Unknown 61-year-old female with left knee pain FINDINGS: Chest: Low lung volume exam. Bilateral pleural effusions, right greater than left with bibasilar airspace disease. Cardiomegaly. Underlying COPD. Trachea midline. No pneumothorax. No free air. No acute osseous abnormality. Mild diffuse degenerative changes throughout the spine. Left knee: Moderate narrowing of the medial compartment. Mild tricompartmental osteophyte spurring. Mild degenerative changes of the patellofemoral compartment. Atherosclerotic calcification of the vasculature. No suspicious osteolytic or osteoblastic lesions. No acute fracture or dislocation. Diffuse osteopenia. No sizable joint effusion. Chest: 1. Cardiomegaly with bilateral pleural effusions and bibasilar airspace disease, right greater than left. These findings likely represent CHF related changes. Underlying infiltrate not excluded. Follow-up recommended to document resolution. 2. Underlying COPD. 3. Low lung volume study. Left knee: 1. Tricompartmental osteoarthrosis. Diffuse osteopenia. 2. No acute fracture or dislocation.      Xr Knee Left (3 Views)    Result Date: 2/2/2021  EXAMINATION: THREE XRAY VIEWS OF THE LEFT KNEE; TWO XRAY VIEWS OF THE CHEST 2/2/2021 2:00 pm COMPARISON: PA and lateral chest from 10/03/2011 HISTORY: ORDERING SYSTEM PROVIDED HISTORY: Pain TECHNOLOGIST PROVIDED HISTORY: Pain Reason for Exam: Knee pain Acuity: Unknown Type of Exam: Unknown 71-year-old female with left knee pain FINDINGS: Chest: Low lung volume exam. Bilateral pleural effusions, right greater than left with bibasilar airspace disease. Cardiomegaly. Underlying COPD. Trachea midline. No pneumothorax. No free air. No acute osseous abnormality. Mild diffuse degenerative changes throughout the spine. Left knee: Moderate narrowing of the medial compartment. Mild tricompartmental osteophyte spurring. Mild degenerative changes of the patellofemoral compartment. Atherosclerotic calcification of the vasculature. No suspicious osteolytic or osteoblastic lesions. No acute fracture or dislocation. Diffuse osteopenia. No sizable joint effusion. Chest: 1. Cardiomegaly with bilateral pleural effusions and bibasilar airspace disease, right greater than left. These findings likely represent CHF related changes. Underlying infiltrate not excluded. Follow-up recommended to document resolution. 2. Underlying COPD. 3. Low lung volume study. Left knee: 1. Tricompartmental osteoarthrosis. Diffuse osteopenia. 2. No acute fracture or dislocation. Ct Abdomen Pelvis W Iv Contrast    Result Date: 2/2/2021  EXAMINATION: CT OF THE ABDOMEN AND PELVIS WITH CONTRAST 2/2/2021 2:24 pm TECHNIQUE: CT of the abdomen and pelvis was performed with the administration of intravenous contrast. Multiplanar reformatted images are provided for review. Dose modulation, iterative reconstruction, and/or weight based adjustment of the mA/kV was utilized to reduce the radiation dose to as low as reasonably achievable. COMPARISON: None. HISTORY: ORDERING SYSTEM PROVIDED HISTORY: Pain TECHNOLOGIST PROVIDED HISTORY: IV Only Contrast Pain Reason for Exam: Pt is deaf.   Pt wrote that she fell in November and December. No prior surgery Acuity: Acute Type of Exam: Initial FINDINGS: Lower Chest: Moderate-large dependent pleural effusions greater on the right are present with adjacent compressive atelectasis. There is aneurysm of the descending thoracic aorta only partially imaged, measuring at least up to 5.6 cm Organs: No acute abnormality of the organs of the abdomen. No evidence of pancreatitis. No ureteral stone or hydronephrosis. No evidence of pyelonephritis. GI/Bowel: No bowel obstruction or other acute process demonstrated. No evidence of colitis, diverticulitis or appendicitis. There is diffuse colonic diverticulosis. Pelvis: There is a mixed cystic and solid mass in the right-sided pelvis likely arising from the ovary measuring 6.3 x 5.1 cm on axial image 165. In the left-sided pelvis there is a mixed density elongated mass which includes fat density. This measures 13.1 x 5.4 cm on axial image 173. Peritoneal nodularity and possible adjacent smaller masses compressed against each other are also present throughout the pelvis. Peritoneum/Retroperitoneum: Peritoneal nodularity and omental nodularity with thickening. Extensive ascites. No free air. Bones/Soft Tissues: No fracture or other acute osseous process. 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 Aneurysm of the descending thoracic aorta measures up to 5.6 cm.      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 A separate 13.1 x 5.4 cm teratoma of the left-sided pelvis      Moderate-large right greater left pleural effusions with compressive   atelectasis. Aneurysm of the descending thoracic aorta measures up to 5.6 cm. XR CHEST (2 VW)   Final Result   Chest:      1. Cardiomegaly with bilateral pleural effusions and bibasilar airspace   disease, right greater than left. These findings likely represent CHF   related changes. Underlying infiltrate not excluded. Follow-up recommended   to document resolution. 2. Underlying COPD. 3. Low lung volume study. Left knee:      1. Tricompartmental osteoarthrosis. Diffuse osteopenia. 2. No acute fracture or dislocation. XR KNEE LEFT (3 VIEWS)   Final Result   Chest:      1. Cardiomegaly with bilateral pleural effusions and bibasilar airspace   disease, right greater than left. These findings likely represent CHF   related changes. Underlying infiltrate not excluded. Follow-up recommended   to document resolution. 2. Underlying COPD. 3. Low lung volume study. Left knee:      1. Tricompartmental osteoarthrosis. Diffuse osteopenia. 2. No acute fracture or dislocation.                  LABS:  Labs Reviewed   HEPATIC FUNCTION PANEL - Abnormal; Notable for the following components:       Result Value    Albumin 3.0 (*)     Alkaline Phosphatase 134 (*)     Bilirubin, Direct 0.45 (*)     Total Protein 8.4 (*)     All other components within normal limits   CBC WITH AUTO DIFFERENTIAL - Abnormal; Notable for the following components:    RBC 6.35 (*)     Hemoglobin 15.4 (*)     Hematocrit 50.1 (*)     MCV 78.9 (*)     MCH 24.3 (*)     RDW 17.0 (*)     Seg Neutrophils 75 (*)     Lymphocytes 14 (*)     Eosinophils % 0 (*)     Immature Granulocytes 1 (*)     All other components within normal limits   BASIC METABOLIC PANEL - Abnormal; Notable for the TO:   No follow-up provider specified.     DISCHARGE MEDICATIONS:     New Prescriptions    No medications on file           (Please note that portions of this note were completed with a voice recognition program.  Efforts were made to edit the dictations but occasionally words are mis-transcribed.)    Arnulfo Everett NP, APRN - CNP  Certified Nurse Practitioner          ALYSSA Newton - CNP  02/02/21 0972

## 2021-02-03 NOTE — PLAN OF CARE
Problem: Skin Integrity:  Goal: Absence of new skin breakdown  Description: Absence of new skin breakdown  Outcome: Ongoing  Note: Pts skin maintains structural intactness and physiologic function. Pt able to reposition independently in bed/ Assisting pt with turns every 2 hours and heels elevated off bed. Linens remain clean and dry. Will continue to monitor.

## 2021-02-03 NOTE — CONSULTS
[facility]    Patient Name: Walt Ramírez     Patient : 1950  Room/Bed: 3627/7161-30  Admission Date/Time: 2021 11:17 PM  Primary Care Physician: No primary care provider on file. Consulting Provider: Maria Guadalupe Dillon   Reason for Consult: Bernabe Howell     CC: Pelvic Mass              HPI: Walt Ramírez is a 72 y.o. female . Patient was transferred from [facility] with a pelvic mass. She initially presented following weakness with a fall at home. She reports several falls in December. She reports recent distention of her abdomen with associated SOB and early satiety. She has not seen a physician in approximately 5y and admits to stopping all home medications at that time. Patient reports moving from [place] to [facility] approximately 5 years ago at which time she stopped seeing a physician. She is unsure when she started having increased abdominal bloating and distention but reports several months of weakness with falling. Admits to a history of breast cancer in her mother who had a unilateral mastectomy. She is unsure of any other female cancers in the family. Reports having a normal mammogram two years ago which was due again in November, however, due to [facility] she did not have this completed. She has a history of  for twins at 24yo. No LMP recorded. Patient is postmenopausal.     REVIEW OF SYSTEMS:   A minimum of an eleven point review of systems was completed.   Constitutional: negative fever, negative chills   HEENT: negative visual disturbances, negative headaches  Respiratory: negative dyspnea, negative cough  Cardiovascular: negative chest pain,  negative palpitations  Gastrointestinal: positive abdominal pain, negative RUQ pain, negative N/V, negative diarrhea, negative constipation  Genitourinary: negative dysuria, negative vaginal discharge  Dermatological: negative rash  Hematologic: negative bruising  Immunologic/Lymphatic: negative recent illness, negative recent sick contact  Musculoskeletal: negative back pain, negative myalgias, negative arthralgias  Neurological:  negative dizziness, positive weakness  Behavior/Psych: negative depression, negative anxiety      _______________________________________________________________________    GYNECOLOGICAL HISTORY:  Age of Menarche: 13  Age of Menopause: unsure      Sexually Active: not sexually active - \"too old\" per patient   STD History: no known past history    Pap History: hx of abnormal pap per patient but unsure of dx, date, or need for surgery    Permanent Sterilization: denies  Reversible Birth Control: denies  Hormone Replacement Exposure: denies    OBSTETRICAL HISTORY:   OB History   No obstetric history on file. PAST MEDICAL HISTORY:   has a past medical history of Hyperlipidemia, Hypertension, and Thyroid disease. PAST SURGICAL HISTORY:   has no past surgical history on file.     ALLERGIES:  Allergies as of 02/02/2021    (No Known Allergies)       MEDICATIONS:  Current Facility-Administered Medications   Medication Dose Route Frequency Provider Last Rate Last Admin    albuterol (PROVENTIL) nebulizer solution 2.5 mg  2.5 mg Nebulization Q4H PRN ALYSSA Victoria NP        hydrALAZINE (APRESOLINE) injection 10 mg  10 mg Intravenous Q6H PRN ALYSSA Green - NP   10 mg at 02/03/21 0001    sodium chloride flush 0.9 % injection 10 mL  10 mL Intravenous 2 times per day ALYSSA Green - NP        sodium chloride flush 0.9 % injection 10 mL  10 mL Intravenous PRN ALYSSA Green - NP        potassium chloride (KLOR-CON M) extended release tablet 40 mEq  40 mEq Oral PRN ALYSSA Green - NP        Or    potassium bicarb-citric acid (EFFER-K) effervescent tablet 40 mEq  40 mEq Oral PRN ALYSSA Green - NP        Or    potassium chloride 10 mEq/100 mL IVPB (Peripheral Line)  10 mEq Intravenous PRN ALYSSA Green - NP        magnesium sulfate 1000 mg in dextrose 5% 100 mL IVPB 1,000 mg Intravenous PRN Noemi Haney, APRN - NP        [Held by provider] enoxaparin (LOVENOX) injection 40 mg  40 mg Subcutaneous Daily Noemi Haney, APRN - NP        promethazine (PHENERGAN) tablet 12.5 mg  12.5 mg Oral Q6H PRN Noemi Haney, APRN - NP        Or    ondansetron [facility]) injection 4 mg  4 mg Intravenous Q6H PRN Noemi Medina, APRN - NP        polyethylene glycol (GLYCOLAX) packet 17 g  17 g Oral Daily PRN Noemi Medina, APRN - NP        nicotine (NICODERM CQ) 21 MG/24HR 1 patch  1 patch Transdermal Daily PRN Noemi Medina, APRN - NP        acetaminophen (TYLENOL) tablet 650 mg  650 mg Oral Q6H PRN Noemi Haney, APRN - NP        Or    acetaminophen (TYLENOL) suppository 650 mg  650 mg Rectal Q6H PRN Noemi Haney, APRN - NP        famotidine (PEPCID) tablet 20 mg  20 mg Oral Daily Noemi Haney, APRN - NP           FAMILY HISTORY:  Family History of Breast, Ovarian, Colon or Uterine Cancer: Yes - breast cancer in mother per patient with hx unilateral mastectomy   family history is not on file. SOCIAL HISTORY:   reports that she has quit smoking. She does not have any smokeless tobacco history on file. She reports previous alcohol use. She reports that she does not use drugs. HEALTH MAINTENANCE:  Date of Last Mammogram: was normal approx 2y ago per patient   ________________________________________________________________________                                    Issa Ori:  Vitals:    02/03/21 0351 02/03/21 0400 02/03/21 0500 02/03/21 0600   BP: (!) 147/88 (!) 155/90 (!) 153/88 (!) 147/90   Pulse: 105 105 100 100   Resp:  27 (!) 33 30   Temp: 98.4 °F (36.9 °C)      TempSrc: Oral      SpO2: 96% 95% 96%    Weight:                                                        INPUT/OUTPUT:  No intake/output data recorded. In: 26 [P.O.:450;  I.V.:10]  Out: -                                                                                                                                PHYSICAL EXAM:     General Appearance: Alert, obese, moderate distress. Pleasant. Skin: Skin color, texture, turgor normal. No rashes or lesions. Lymphatic: No obviously abnormally enlarged lymph nodes. Neck and EENT: normal atraumatic, no neck masses, normal thyroid, no jvd, hearing impaired   Respiratory: Normal expansion. Decreased air movement. Wheezing, tachypnic. Cardiovascular: regular rate and rhythm  Breast: (Chest) declined   Abdomen: soft, non-tender, moderately distended, no right upper quadrant tenderness and no CVA tenderness, no rebound, guarding, or rigidity, ascites present   Pelvic Exam:   Chaperone for Intimate Exam: declined by patient     Rectal Exam: exam declined by patient  Musculoskeletal: no gross abnormalities  Extremities: non-tender BLE and non-edematous  Psych:  oriented to time, place and person         LAB RESULTS:  Results for orders placed or performed during the hospital encounter of 02/02/21   D-Dimer, Quantitative   Result Value Ref Range    D-Dimer, Quant 15.02 mg/L FEU   Protein, Total   Result Value Ref Range    Total Protein 7.9 6.4 - 8.3 g/dL   Lactate Dehydrogenase   Result Value Ref Range     (H) 135 - 214 U/L       DIAGNOSTICS:    Narrative   EXAMINATION:   CT OF THE ABDOMEN AND PELVIS WITH CONTRAST 2/2/2021 2:24 pm       TECHNIQUE:   CT of the abdomen and pelvis was performed with the administration of   intravenous contrast. Multiplanar reformatted images are provided for review.    Dose modulation, iterative reconstruction, and/or weight based adjustment of   the mA/kV was utilized to reduce the radiation dose to as low as reasonably   achievable.       COMPARISON:   None.       HISTORY:   ORDERING SYSTEM PROVIDED HISTORY: Pain   TECHNOLOGIST PROVIDED HISTORY:   IV Only Contrast   Pain   Reason for Exam: Pt is deaf.  Pt wrote that she fell in November and   December.  No prior surgery   Acuity: Acute   Type of Exam: Initial       FINDINGS:   Lower Chest: Moderate-large dependent pleural effusions greater on the right   are present with adjacent compressive atelectasis. Juan Joshi is aneurysm of the   descending thoracic aorta only partially imaged, measuring at least up to 5.6   cm       Organs: No acute abnormality of the organs of the abdomen. No evidence of   pancreatitis.  No ureteral stone or hydronephrosis. No evidence of   pyelonephritis.       GI/Bowel: No bowel obstruction or other acute process demonstrated.  No   evidence of colitis, diverticulitis or appendicitis. Juan Joshi is diffuse   colonic diverticulosis.       Pelvis: There is a mixed cystic and solid mass in the right-sided pelvis   likely arising from the ovary measuring 6.3 x 5.1 cm on axial image 165.  In   the left-sided pelvis there is a mixed density elongated mass which includes   fat density.  This measures 13.1 x 5.4 cm on axial image 173.  Peritoneal   nodularity and possible adjacent smaller masses compressed against each other   are also present throughout the pelvis.       Peritoneum/Retroperitoneum: Peritoneal nodularity and omental nodularity with   thickening.  Extensive ascites.  No free air.       Bones/Soft Tissues: No fracture or other acute osseous process.           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       A separate 13.1 x 5.4 cm teratoma of the left-sided pelvis       Moderate-large right greater left pleural effusions with compressive   atelectasis.       Aneurysm of the descending thoracic aorta measures up to 5.6 cm.      Narrative   EXAMINATION:   THREE XRAY VIEWS OF THE LEFT KNEE; TWO XRAY VIEWS OF THE CHEST       2/2/2021 2:00 pm       COMPARISON:   PA and lateral chest from 10/03/2011       HISTORY:   ORDERING SYSTEM PROVIDED HISTORY: Pain   TECHNOLOGIST PROVIDED HISTORY:   Pain   Reason for Exam: Knee pain Acuity: Unknown   Type of Exam: Unknown       61-year-old female with left knee pain       FINDINGS:   Chest:       Low lung volume exam.       Bilateral pleural effusions, right greater than left with bibasilar airspace   disease.  Cardiomegaly.  Underlying COPD.  Trachea midline.  No pneumothorax. No free air.  No acute osseous abnormality.       Mild diffuse degenerative changes throughout the spine.       Left knee:       Moderate narrowing of the medial compartment.  Mild tricompartmental   osteophyte spurring.  Mild degenerative changes of the patellofemoral   compartment.  Atherosclerotic calcification of the vasculature.  No   suspicious osteolytic or osteoblastic lesions.  No acute fracture or   dislocation.  Diffuse osteopenia.  No sizable joint effusion.           Impression   Chest:       1. Cardiomegaly with bilateral pleural effusions and bibasilar airspace   disease, right greater than left.  These findings likely represent CHF   related changes.  Underlying infiltrate not excluded.  Follow-up recommended   to document resolution. 2. Underlying COPD. 3. Low lung volume study. Left knee:       1. Tricompartmental osteoarthrosis.  Diffuse osteopenia. 2. No acute fracture or dislocation.               ASSESSMENT & PLAN:    Tabitha Garcia is a 72 y.o. 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 Patient will likely require chemotherapy prior to surgical management.     - Lovenox per primary following paracentesis/thoracetesis    - Hx  of twins at 25yo per patient    -  and CEA ordered    - Paracentesis and thoracentesis today    - GYN ONC will continue following while patient is admitted      Weakness    - Admits to several falls over the last 2-3 mo    - Denies head trauma     Abdominal aortic aneurysm (5.6cm)   - Incidental finding on CT scan at outlying facility    - Vascular surgery following. Plan to consider surgical management pending further work up. Hypoxia    - 3L O2 NC at this time    - Hx tobacco abuse, quit 5y ago   - Denies home O2 use    - Large dependent pleural effusions R>L with compressive atelectasis noted on CTA    - D dimer 19.84   - CT negative for PE     Hearing impairment    - Ipad at bedside     HTN, HLD, Thyroid dz    - Management per primary    - Patient admits to noncompliance with medications x5y       Patient Active Problem List    Diagnosis Date Noted    Pleural effusion, bilateral 02/03/2021    Abdominal ascites 02/03/2021    Thoracic aortic aneurysm without rupture ([address] 02/03/2021    Hearing impaired 02/03/2021    Hypoxia 02/03/2021    Pelvic mass 02/02/2021       Plan discussed with Dr. Sanjay Nova, who is agreeable. Attending's Name: Dr. Aiyana Alicia  Ob/Gyn Resident  Pager: [phone]  Farhanmercedkaylin 174  2/3/2021, 7:07 AM         Attending Physician Statement  I have discussed the care of Bernadine Alcala, including pertinent history and exam findings,  with the resident. I have seen and examined the patient and the key elements of all parts of the encounter have been performed by me. I agree with the assessment, plan and orders as documented by the resident.      Andrea Diaz MD

## 2021-02-03 NOTE — PROGRESS NOTES
Occupational Therapy Not Seen Note    DATE: 2/3/2021  Name: Missy Baca  : 1950  MRN: [id number]    Patient not available for Occupational Therapy due to:    Testing: Dopplers and CT for PE Ordered    Next Scheduled Treatment: Ck in pm as able or 2/4    Electronically signed by Noel Long OT on 2/3/2021 at 7:53 AM

## 2021-02-03 NOTE — CARE COORDINATION
Met with pt at bedside to complete initial assessment. She is deaf. She asked if I would call her . Called number in chart. Pt answered via service that interprets for her.  She stated she did not feel good right now and did not want to talk

## 2021-02-03 NOTE — BRIEF OP NOTE
Brief Postoperative Note for Thoracentesis    Niya Nelson  YOB: 1950  [id number]    Pre-operative Diagnosis: Bilateral Pleural effusion      Post-operative Diagnosis: Same    Procedure: Ultrasound guided Thoracentesis     Anesthesia: 1% Lidocaine     Surgeons/Assistants: Cesar Berry PA-C    Complications: none    EBL: Minimal    Specimens: were obtained    Ultrasound guided right thoracentesis performed. 700 ml calixto fluid obtained. Dressing applied. Ultrasound guided left thoracentesis performed. 450 ml calixto fluid obtained. Dressing applied.      Electronically signed by RODRICK Miranda on 2/3/2021 at 9:16 AM

## 2021-02-04 NOTE — PROGRESS NOTES
for ovarian cancer with significant ascites and bilateral pleural effusion , hypoxic needing 4 L nasal cannula, elevated D-dimer at 19 and incidental finding of 5.6 cm descending aortic aneurysm along with transaminitis and elevated LD. Up for Covid and came back negative. Patient was transferred to our facility for further management. I saw her later today after she had her thoracentesis , she is a still on 4 L nasal cannula feeling better after the 700 mile or out      Review of Systems:     Review of Systems   Constitutional: Positive for activity change, appetite change and fatigue. Negative for chills, diaphoresis and fever. HENT: Negative for congestion. Eyes: Negative for visual disturbance. Respiratory: Positive for shortness of breath. Negative for cough, chest tightness and wheezing. Cardiovascular: Negative for chest pain, palpitations and leg swelling. Gastrointestinal: Positive for abdominal distention (better). Negative for abdominal pain, blood in stool, constipation, diarrhea, nausea and vomiting. Genitourinary: Negative for difficulty urinating. Neurological: Positive for weakness. Negative for dizziness, light-headedness, numbness and headaches. All other systems reviewed and are negative. Medications:      Allergies:  No Known Allergies    Current Meds:   Scheduled Meds:    cefTRIAXone (ROCEPHIN) IV  1,000 mg Intravenous Q24H    sodium chloride flush  10 mL Intravenous 2 times per day    carvedilol  3.125 mg Oral BID WC    enoxaparin  40 mg Subcutaneous Daily    famotidine  20 mg Oral Daily     Continuous Infusions:    dextrose 5% and 0.45% NaCl with KCl 20 mEq       PRN Meds: labetalol, albuterol, sodium chloride flush, hydrALAZINE, potassium chloride **OR** potassium alternative oral replacement **OR** potassium chloride, magnesium sulfate, promethazine **OR** ondansetron, polyethylene glycol, nicotine, acetaminophen **OR** acetaminophen    Data:     Past Medical History:   has a past medical history of Hyperlipidemia, Hypertension, and Thyroid disease. Social History:   reports that she has quit smoking. She does not have any smokeless tobacco history on file. She reports previous alcohol use. She reports that she does not use drugs. Family History: History reviewed. No pertinent family history. Vitals:  BP (!) 167/94   Pulse 79   Temp 98.3 °F (36.8 °C) (Oral)   Resp 29   Wt 202 lb 13.2 oz (92 kg)   SpO2 94%   Temp (24hrs), Av.8 °F (36.6 °C), Min:97.1 °F (36.2 °C), Max:98.4 °F (36.9 °C)    No results for input(s): POCGLU in the last 72 hours. I/O (24Hr):     Intake/Output Summary (Last 24 hours) at 2021 4323  Last data filed at 2021 0426  Gross per 24 hour   Intake 50 ml   Output --   Net 50 ml       Labs:  Hematology:  Recent Labs     21  1300 21  0020 21  0721  0535   WBC 10.0  --  6.6 6.1   RBC 6.35*  --  5.76* 5.82*   HGB 15.4*  --  13.9 14.0   HCT 50.1*  --  45.8 45.9   MCV 78.9*  --  79.5* 78.9*   MCH 24.3*  --  24.1* 24.1*   MCHC 30.7  --  30.3 30.5   RDW 17.0*  --  16.0* 16.1*     --  324 295   MPV 9.5  --  10.0 10.0   INR 1.7  --  1.8 1.8   DDIMER 19.84* 15.02  --   --      Chemistry:  Recent Labs     21  1300 21  0721  0535    135 135   K 4.2 4.0 3.8   CL 94* 97* 97*   CO2 27 26 28   GLUCOSE 125* 90 110*   BUN 22 22 19   CREATININE 1.02* 0.81 0.69   ANIONGAP 14 12 10   LABGLOM 54* >60 >60   GFRAA >60 >60 >60   CALCIUM 8.8 8.8 8.4*   PROBNP  --  1,213*  --      Recent Labs     21  1300 21  0020 21  0712 21  1225   PROT 8.4* 7.9  --   --    LABALBU 3.0*  --   --  2.9*   TSH  --   --  0.86  --    AST 19  --   --   --    ALT 8  --   --   --    LDH  --  410*  --   --    ALKPHOS 134*  --   --   --    BILITOT 0.75  --   --   --    BILIDIR 0.45*  --   --   --    AMYLASE 95  --   --   --    LIPASE 33  --   --   --      ABG:No results found for: POCPH, PHART, atelectasis. Large volume ascites within the visualized upper abdomen. Us Thoracentesis Which Side Should The Procedure Be Performed? Left    Result Date: 2/3/2021  Successful ultrasound guided thoracentesis. Us Thoracentesis Which Side Should The Procedure Be Performed? Right    Result Date: 2/3/2021  Successful ultrasound guided thoracentesis. Physical Examination:        Physical Exam  Vitals signs and nursing note reviewed. Constitutional:       General: She is not in acute distress. HENT:      Head: Normocephalic and atraumatic. Eyes:      Conjunctiva/sclera: Conjunctivae normal.      Pupils: Pupils are equal, round, and reactive to light. Cardiovascular:      Rate and Rhythm: Normal rate and regular rhythm. Heart sounds: No murmur. Pulmonary:      Effort: Tachypnea and respiratory distress present. No accessory muscle usage. Breath sounds: No stridor. Examination of the right-lower field reveals decreased breath sounds. Examination of the left-lower field reveals decreased breath sounds. Decreased breath sounds present. No wheezing, rhonchi or rales. Comments: On nasal canula  Abdominal:      General: Bowel sounds are normal. There is no distension (improved after the paracentesis). Palpations: Abdomen is soft. Abdomen is not rigid. Tenderness: There is no abdominal tenderness. There is no guarding. Musculoskeletal:         General: No tenderness. Skin:     General: Skin is warm and dry. Findings: No erythema, lesion or rash. Neurological:      Mental Status: She is alert and oriented to person, place, and time. Cranial Nerves: No cranial nerve deficit. Motor: No seizure activity. Psychiatric:         Speech: Speech normal.         Behavior: Behavior normal. Behavior is cooperative.          Assessment:        Hospital Problems           Last Modified POA    * (Principal) Pelvic mass 2/3/2021 Yes    Pleural effusion, bilateral 2/3/2021 Yes Abdominal ascites 2/3/2021 Yes    Thoracic aortic aneurysm without rupture ([address] 2/3/2021 Yes    Hearing impaired 2/3/2021 Yes    Hypoxia 2/3/2021 Yes    Malignant neoplasm of left ovary ([address] 2/3/2021 Yes    Acute hypoxemic respiratory failure ([address] 2/3/2021 Yes    Elevated d-dimer 2/3/2021 Yes    Hyperlipidemia 2/3/2021 Yes    Hypertension 2/3/2021 Yes    Thyroid disease 2/3/2021 Yes          Plan:          Likely metastatic L ovarian cancer with ascites and pleural effusion  S/P thoracentesis 2/3  S/P paracentesis 2/4  Studies pending  Plan for chemo then debulking  Evaluated by oncology and gyn  Plan for port placement later today       Acute hypoxemic respiratory failure: Likely secondary to pleural affusion and ascited, after large volume paracentesis se is more in respiratory distress, added BiPAP    Descending thoracic aortic aneurysm :  - size is 5.6 cm  - BP control , add coreg  -Evaluated by vascular surgery team , continue  to monitor per their recommendation     Episode of hypotension and tachycardia : repeat EKG unchanged, give small bolus and the albumin     -Elevated BNP and trops : get echocardiogram     - Elevated INR  : likely 2/2 malignany    Elevated D dimer : likely 2/2 #1 , DVT and PE ruled out     HTN: continue home medications    HPL: continue home medications    DVT prophylaxis    Patient was evaluated by gynecology/vascular surgery and oncology team as well   Family and patient has good understanding of the critical situation      Very critical condition , continue to monitor very closely       Austyn Collado MD  2/4/2021  7:55 AM

## 2021-02-04 NOTE — TELEPHONE ENCOUNTER
Dr. John Savage called in stating pt completed thoracentesis yesterday, cytology sent, & paracentesis to be completed today. Dr. Sammi Dockery stated pt will need chemotherapy started asap & requested writer contact Dr. Terell Porter to coordinate. Spoke with Dr. Terell Porter updated on conversation with Dr. John Savage. Dr. Terell Porter stated will contact [address] nurse & request port placement asap. Dr. Terell Porter stated chemotherapy orders placed & requested writer contact Ruby Harper , to request chemotherapy PA submitted urgently. [employer] message sent to Kay Loredo updated on pt & Dr. Lencho Joyce request.  Debbi Burt care coordinator, updated. Will continue to follow.

## 2021-02-04 NOTE — PROGRESS NOTES
Pt arrives to room for para   ipad utilized to [employer] and [facility] to room  Para kit exp 12/21   site prepped and draped  Access obtained and draining  Specimen obtained  Draining clear yellow fluid  10.8L removed  Albumin ordered  Site covered with 2x2 and tegaderm  Pt updated and questions answered via clipboard  Return to floor

## 2021-02-04 NOTE — CARE COORDINATION
Case Management Initial Discharge Plan  Junita Duverney,             Met with:patient to discuss discharge plans. Information verified: address, contacts, phone number, , insurance Yes    Emergency Contact/Next of Kin name & number:   Piyush Jean      Spouse    [phone]     PCP: Dr. Kesha Mon Date of last visit: 5 years ago    Insurance Provider: Medicare part A and B    Discharge Planning    Living Arrangements:      Support Systems:       Home has 2 stories  6 stairs to climb to get into front door,     Patient able to perform ADL's:Independent    Current Services (outpatient & in home)   DME equipment: has walker at home  DME provider:     Receiving oral anticoagulation therapy? No    If indicated:   Physician managing anticoagulation treatment:   Where does patient obtain lab work for ATC treatment? Potential Assistance Needed:       Patient agreeable to home care: No  [place] of choice provided:  n/a    Prior SNF/Rehab Placement and Facility:   Agreeable to SNF/Rehab: No  [place]illiam of choice provided: n/a     Evaluation: n/a    Expected Discharge date:       Patient expects to be discharged to: Follow Up Appointment: Best Day/ Time:      Transportation provider:   Transportation arrangements needed for discharge: Yes    Readmission Risk              Risk of Unplanned Readmission:        13             Does patient have a readmission risk score greater than 14?: No  If yes, follow-up appointment must be made within 7 days of discharge. Goals of Care:       Discharge Plan: home goal with ; Nick. Will start chemo tomorrow.      Electronically signed by Salazar Vieyra RN on 21 at 6:15 PM EST

## 2021-02-04 NOTE — ANESTHESIA PRE PROCEDURE
Department of Anesthesiology  Preprocedure Note       Name:  Rui Escobedo   Age:  72 y.o.  :  1950                                          MRN:  [id number]         Date:  2021      Surgeon: Abhi Stewart):  Hayder Mtz MD    Procedure: Procedure(s):  PORT PLACEMENT    Medications prior to admission:   Prior to Admission medications    Not on File       Current medications:    Current Facility-Administered Medications   Medication Dose Route Frequency Provider Last Rate Last Admin    labetalol (NORMODYNE;TRANDATE) injection 20 mg  20 mg Intravenous Q2H PRN ALYSSA Atkins - CNP   20 mg at 21 0416    carvedilol (COREG) tablet 6.25 mg  6.25 mg Oral BID WC Amanda Orantes MD   6.25 mg at 21 3989    albumin human 25 % IV solution 100 g  100 g Intravenous Once [employer],  mL/hr at 21 1212 100 g at 21 1212    0.9 % sodium chloride bolus  250 mL Intravenous Once Amanda Orantes  mL/hr at 21 1252 Restarted at 21 1252    albuterol (PROVENTIL) nebulizer solution 2.5 mg  2.5 mg Nebulization Q4H PRN Mell Jessica, APRN - NP        cefTRIAXone (ROCEPHIN) 1000 mg IVPB in 50 mL D5W minibag  1,000 mg Intravenous Q24H Amanda Orantes MD   Stopped at 21 1617    sodium chloride flush 0.9 % injection 10 mL  10 mL Intravenous 2 times per day Garnette Diesel, APRN - NP   10 mL at 21 0827    sodium chloride flush 0.9 % injection 10 mL  10 mL Intravenous PRN Garnette Diesel, APRN - NP        hydrALAZINE (APRESOLINE) injection 10 mg  10 mg Intravenous Q6H PRN Garnette Diesel, APRN - NP   10 mg at 21 3850    potassium chloride (KLOR-CON M) extended release tablet 40 mEq  40 mEq Oral PRN Garnette Diesel, APRN - NP        Or    potassium bicarb-citric acid (EFFER-K) effervescent tablet 40 mEq  40 mEq Oral PRN Garnette Diesel, APRN - NP        Or    potassium chloride 10 mEq/100 mL IVPB (Peripheral Line)  10 mEq Intravenous PRN Shahram Logan, APRN - NP  magnesium sulfate 1000 mg in dextrose 5% 100 mL IVPB  1,000 mg Intravenous PRN Karen [place], APRN - NP        enoxaparin (LOVENOX) injection 40 mg  40 mg Subcutaneous Daily Karen [place], APRN - NP        promethazine (PHENERGAN) tablet 12.5 mg  12.5 mg Oral Q6H PRN Karen [place], APRN - NP        Or    ondansetron [facility]) injection 4 mg  4 mg Intravenous Q6H PRN Karen [place], APRN - NP        polyethylene glycol [facility]) packet 17 g  17 g Oral Daily PRN Karen [place], APRN - NP        nicotine (NICODERM CQ) 21 MG/24HR 1 patch  1 patch Transdermal Daily PRN Karen [place], APRN - NP        acetaminophen (TYLENOL) tablet 650 mg  650 mg Oral Q6H PRN Karen [place], APRN - NP        Or    acetaminophen (TYLENOL) suppository 650 mg  650 mg Rectal Q6H PRN Karen [place], APRN - NP        famotidine (PEPCID) tablet 20 mg  20 mg Oral Daily Karen [place], APRN - NP   20 mg at 02/04/21 6074       Allergies:  No Known Allergies    Problem List:    Patient Active Problem List   Diagnosis Code    Pelvic mass R19.00    Pleural effusion, bilateral J90    Abdominal ascites R18.8    Thoracic aortic aneurysm without rupture (HCC) I71.2    Hearing impaired H91.90    Hypoxia R09.02    Malignant neoplasm of left ovary (HCC) C56.2    Acute hypoxemic respiratory failure (HCC) J96.01    Elevated d-dimer R79.89    Hyperlipidemia E78.5    Hypertension I10    Thyroid disease E07.9       Past Medical History:        Diagnosis Date    Hyperlipidemia     Hypertension     Thyroid disease        Past Surgical History:  History reviewed. No pertinent surgical history.     Social History:    Social History     Tobacco Use    Smoking status: Former Smoker   Substance Use Topics    Alcohol use: Not Currently     Frequency: Never                                Counseling given: Not Answered      Vital Signs (Current):   Vitals:    02/04/21 0812 02/04/21 0858 02/04/21 0921 02/04/21 1231   BP: (!) 169/96 (!) 141/87 130/71    Pulse: 78 90 82 Resp:  20 20 29   Temp:       TempSrc:       SpO2:  97% 96%    Weight:                                                  BP Readings from Last 3 Encounters:   02/04/21 130/71   02/02/21 (!) 162/103       NPO Status:                                                                                 BMI:   Wt Readings from Last 3 Encounters:   02/04/21 202 lb 13.2 oz (92 kg)   02/02/21 200 lb (90.7 kg)     There is no height or weight on file to calculate BMI.    CBC:   Lab Results   Component Value Date    WBC 6.1 02/04/2021    RBC 5.82 02/04/2021    RBC 5.38 10/04/2011    HGB 14.0 02/04/2021    HCT 45.9 02/04/2021    MCV 78.9 02/04/2021    RDW 16.1 02/04/2021     02/04/2021     10/04/2011       CMP:   Lab Results   Component Value Date     02/04/2021    K 3.8 02/04/2021    CL 97 02/04/2021    CO2 28 02/04/2021    BUN 19 02/04/2021    CREATININE 0.69 02/04/2021    GFRAA >60 02/04/2021    LABGLOM >60 02/04/2021    GLUCOSE 110 02/04/2021    GLUCOSE 105 10/04/2011    PROT 7.9 02/03/2021    CALCIUM 8.4 02/04/2021    BILITOT 0.75 02/02/2021    ALKPHOS 134 02/02/2021    AST 19 02/02/2021    ALT 8 02/02/2021       POC Tests: No results for input(s): POCGLU, POCNA, POCK, POCCL, POCBUN, POCHEMO, POCHCT in the last 72 hours.     Coags:   Lab Results   Component Value Date    PROTIME 18.4 02/04/2021    INR 1.8 02/04/2021    APTT 26.3 02/02/2021       HCG (If Applicable): No results found for: PREGTESTUR, PREGSERUM, HCG, HCGQUANT     ABGs: No results found for: PHART, PO2ART, BVH4LOE, IYG4RWG, BEART, A7PDPUPE     Type & Screen (If Applicable):  No results found for: LABABO, LABRH    Drug/Infectious Status (If Applicable):  No results found for: HIV, HEPCAB    COVID-19 Screening (If Applicable):   Lab Results   Component Value Date    COVID19 Not Detected 02/02/2021         Anesthesia Evaluation  Patient summary reviewed  Airway:         Dental:          Pulmonary: ROS comment: Hx of hypoxic resp failure    NEGATIVE for SARS-CoV-2, the novel coronavirus associated with   COVID-19. Cardiovascular:    (+) hypertension:, hyperlipidemia      ECG reviewed                     ROS comment: Narrative & Impression    Normal sinus rhythm  Left axis deviation  Septal infarct , age undetermined  Inferior infarct , age undetermined  Abnormal ECG  When compared with ECG of 03-OCT-2011 11:40,  Questionable change in QRS duration         Neuro/Psych:               GI/Hepatic/Renal:            ROS comment: Ascites. Endo/Other:    (+) malignancy/cancer. ROS comment: + thyroid disease Abdominal:           Vascular:                                        Anesthesia Plan      general and MAC     ASA 3       Induction: intravenous. MIPS: Postoperative opioids intended and Prophylactic antiemetics administered. Plan discussed with attending and CRNA.                   Silvana Barragan, ALYSSA - CRNA   2/4/2021

## 2021-02-04 NOTE — PROGRESS NOTES
Vascular Surgery Progress Note         PATIENT NAME: Rene Chowdhury     TODAY'S DATE: 2/4/2021, 10:39 AM    SUBJECTIVE:    Pt seen and examined at bedside this morning. No acute events overnight. Afebrile. S/p thoracentesis and paracentesis. Reports that her abdomen feels better. OBJECTIVE:   Vitals:  /71   Pulse 82   Temp 98.3 °F (36.8 °C) (Oral)   Resp 20   Wt 202 lb 13.2 oz (92 kg)   SpO2 96%      INTAKE/OUTPUT:      Intake/Output Summary (Last 24 hours) at 2/4/2021 1039  Last data filed at 2/4/2021 0426  Gross per 24 hour   Intake 50 ml   Output --   Net 50 ml                 GENERAL: AOx3, NAD  HEENT: EOMI bilaterally  CARDIAC: Regular rate and rhythm. ABDOMEN: Soft, NT, ND, Active Bowel Sounds  EXTREMITY: moves all extremities, no edema. Pulses intact  NEURO: CN II-XII intact. Gross motor intact. Data:  CBC with Differential:    Lab Results   Component Value Date    WBC 6.1 02/04/2021    RBC 5.82 02/04/2021    RBC 5.38 10/04/2011    HGB 14.0 02/04/2021    HCT 45.9 02/04/2021     02/04/2021     10/04/2011    MCV 78.9 02/04/2021    MCH 24.1 02/04/2021    MCHC 30.5 02/04/2021    RDW 16.1 02/04/2021    LYMPHOPCT 14 02/02/2021    MONOPCT 10 02/02/2021    BASOPCT 0 02/02/2021    MONOSABS 0.98 02/02/2021    LYMPHSABS 1.42 02/02/2021    EOSABS <0.03 02/02/2021    BASOSABS <0.03 02/02/2021    DIFFTYPE NOT REPORTED 02/02/2021     BMP:    Lab Results   Component Value Date     02/04/2021    K 3.8 02/04/2021    CL 97 02/04/2021    CO2 28 02/04/2021    BUN 19 02/04/2021    LABALBU 2.9 02/03/2021    LABALBU 4.2 10/04/2011    CREATININE 0.69 02/04/2021    CALCIUM 8.4 02/04/2021    GFRAA >60 02/04/2021    LABGLOM >60 02/04/2021    GLUCOSE 110 02/04/2021    GLUCOSE 105 10/04/2011         ASSESSMENT   3 72year old female with pelvic mass and incidental finding of 5.6 cm thoracic aortic aneurysm. PLAN  1. Pt seen and examined at bedside this morning.    2. Discussed at bedside with the  regarding her diagnosis. We discussed her thoracic aortic aneurysm and recommend repeat imaging in 3 months. We will plan to place her chemo port tomorrow on 2/5/21 at 11 am. Consent obtained, all risks and possible complications discussed at bedside with the pt and her . Questions answered. 3. NPO midnight.          Electronically signed by Cynthia Cheung DO  on 2/4/2021 at 10:39 AM

## 2021-02-04 NOTE — PLAN OF CARE
Problem: Falls - Risk of:  Goal: Will remain free from falls  Description: Will remain free from falls  Outcome: Ongoing  Goal: Absence of physical injury  Description: Absence of physical injury  Outcome: Ongoing     Problem: Skin Integrity:  Goal: Will show no infection signs and symptoms  Description: Will show no infection signs and symptoms  Outcome: Ongoing  Goal: Absence of new skin breakdown  Description: Absence of new skin breakdown  Outcome: Ongoing     Problem:  Activity:  Goal: Ability to tolerate increased activity will improve  Description: Ability to tolerate increased activity will improve  Outcome: Ongoing     Problem: Cardiac:  Goal: Hemodynamic stability will improve  Description: Hemodynamic stability will improve  Outcome: Ongoing  Goal: Complications related to the disease process, condition or treatment will be avoided or minimized  Description: Complications related to the disease process, condition or treatment will be avoided or minimized  Outcome: Ongoing  Goal: Cerebral tissue perfusion will improve  Description: Cerebral tissue perfusion will improve  Outcome: Ongoing     Problem: Coping:  Goal: Ability to identify and develop effective coping behavior will improve  Description: Ability to identify and develop effective coping behavior will improve  Outcome: Ongoing     Problem: Health Behavior:  Goal: Identification of resources available to assist in meeting health care needs will improve  Description: Identification of resources available to assist in meeting health care needs will improve  Outcome: Ongoing     Problem: Nutritional:  Goal: Ability to identify appropriate dietary choices will improve  Description: Ability to identify appropriate dietary choices will improve  Outcome: Ongoing

## 2021-02-04 NOTE — BRIEF OP NOTE
Brief Postoperative Note for Paracentesis    Renee Adler  YOB: 1950 [id number]    Pre-operative Diagnosis:  Ascites      Post-operative Diagnosis: Same    Procedure: Ultrasound guided Paracentesis     Anesthesia: 1% Lidocaine     Surgeons/Assistants: RODRICK Meneses     Complications: none    EBL: Minimal    Specimens: were obtained    Ultrasound guided paracentesis performed. 050917mo clear yellow fluid obtained. Dressing applied. 100 g of 25% human albumin ordered.      Electronically signed by RODRICK Burk on 2/4/2021 at 9:44 AM

## 2021-02-04 NOTE — PROGRESS NOTES
IR placed med port. Vasc Sx to s/o. Pt to f/u as out pt in 3 months with Dr. Medrano. Contact as needed.      Thank you,    Electronically signed by Therese Gabriel DO on 2/4/2021 at 3:22 PM

## 2021-02-04 NOTE — PLAN OF CARE
Problem: Falls - Risk of:  Goal: Will remain free from falls  Description: Will remain free from falls  Outcome: Met This Shift     Problem: Falls - Risk of:  Goal: Absence of physical injury  Description: Absence of physical injury  Outcome: Met This Shift     Problem: Skin Integrity:  Goal: Absence of new skin breakdown  Description: Absence of new skin breakdown  Outcome: Met This Shift     Problem:  Activity:  Goal: Ability to tolerate increased activity will improve  Description: Ability to tolerate increased activity will improve  Outcome: Ongoing     Problem: Coping:  Goal: Ability to identify and develop effective coping behavior will improve  Description: Ability to identify and develop effective coping behavior will improve  Outcome: Ongoing     Problem: Musculor/Skeletal Functional Status  Goal: Highest potential functional level  Outcome: Ongoing

## 2021-02-04 NOTE — TELEPHONE ENCOUNTER
Received new chemo orders. Ztitld=665.7 cm. Weight=90.7 kg. BSA=2.09. Taxol 175 mg/u3=187 mg IV. Carboplatin AUC 5.  Labs placed in Epic. Dexamethasone script sent to MD to sign. Sent to pool for second RN check. Patient inhouse and port placed 2/4/21 along with a paracentesis.

## 2021-02-04 NOTE — PROGRESS NOTES
1200- Patient came back to unit after paracentesis. Patient had increase for SOB and tachypnea, mid 30s. HR up to 160 not sustained. BP 98/56. EKG completed. Patient had no complaints of chest pain. Doctor Bev Lindsey notified. 1209- HR sustained in 150s. Another EKG completed. Doctor Bev Lindsey at bedside. Orders placed. BIPAP applied. Bolus given. [address] spoke with patient. Due to increased SOB and tachypnea, patient decided she did not think she would be able to lay flat for chemotherapy port placement and will try tomorrow. Primary, Oncology, and vascular notified.

## 2021-02-04 NOTE — PROGRESS NOTES
Occupational Therapy   Occupational Therapy Initial Assessment  Date: 2021   Patient Name: Oswald Marino  MRN: [id number]     : 1950    Date of Service: 2021    Discharge Recommendations:  Patient would benefit from continued therapy after discharge  OT Equipment Recommendations  Equipment Needed: Yes  Mobility Devices: ADL Assistive Devices  ADL Assistive Devices: Shower Chair with back; Toileting - Raised Toilet Seat with arms    Assessment   Performance deficits / Impairments: Decreased functional mobility ; Decreased ADL status; Decreased endurance;Decreased high-level IADLs;Decreased balance  Assessment: Pt required Min A for functional transfers, Mod A for toileting, and Max A to katya socks. Pt limited by decreased endurance and increased dizziness. Pt is expected to benefit from skilled OT services to maximize safety and increase independence in ADLs/IADLs and functional mobility tasks. Prognosis: Good  Decision Making: Medium Complexity  Patient Education: Educated on purpose of OT, OT poc, activity promotion, proper hand/foot placement during functional transfers, walker navigation-good return  REQUIRES OT FOLLOW UP: Yes  Activity Tolerance  Activity Tolerance: Patient limited by fatigue  Safety Devices  Safety Devices in place: Yes  Type of devices: All fall risk precautions in place;Gait belt;Patient at risk for falls;Call light within reach; Left in bed;Nurse notified; Bed alarm in place  Restraints  Initially in place: No           Patient Diagnosis(es): There were no encounter diagnoses. has a past medical history of Hyperlipidemia, Hypertension, and Thyroid disease. has no past surgical history on file. Restrictions  Restrictions/Precautions  Restrictions/Precautions: Fall Risk  Required Braces or Orthoses?: No  Position Activity Restriction  Other position/activity restrictions:  Up with assistance; deaf    Subjective   General  Patient assessed for rehabilitation services?: Yes  Family / Caregiver Present: Yes()  Patient Currently in Pain: Denies  Pain Assessment  Pain Assessment: 0-10  Pain Level: 0  Vital Signs  Resp: 29  Patient Currently in Pain: Denies  Oxygen Therapy  Pulse Oximeter Device Mode: Continuous  Pulse Oximeter Device Location: Finger  O2 Device: PAP (positive airway pressure)  Skin Assessment: Clean, dry, & intact  FiO2 : 35 %     Social/Functional History  Social/Functional History  Lives With: Spouse  Type of Home: House  Home Layout: Two level, Bed/Bath upstairs(full flight up stairs)  Home Access: Stairs to enter with rails  Entrance Stairs - Number of Steps: 12  Entrance Stairs - Rails: Both  Bathroom Shower/Tub: Tub/Shower unit(reports  will be redoing bathroom to make it walk in soon)  Bathroom Toilet: Standard  Bathroom Equipment: Grab bars in shower  Home Equipment: (No AD, does not use AD to ambulate at baseline)  ADL Assistance: Independent  Homemaking Assistance: Needs assistance(Shares with )  Homemaking Responsibilities: Yes(Shares with )  Ambulation Assistance: Independent  Transfer Assistance: Independent  Active : No  Patient's  Info:   Occupation: Retired  Leisure & Hobbies: crafts  Additional Comments:  retired and able to assist pt PRN upon discharge home. Objective   Hearing: Exceptions to WFL(Deaf in both ears)    Orientation  Overall Orientation Status: Within Functional Limits     Balance  Sitting Balance: Contact guard assistance(Sitting EOB for LB dressing, toileting ~10 minutes)  Standing Balance: Contact guard assistance  Standing Balance  Time: ~5 minutes  Activity: standing for toileting tasks, functional mobility to/from bathroom  Comment: Use of RW    Functional Mobility  Functional - Mobility Device: Rolling Walker  Activity: To/From therapy gym  Assist Level: Contact guard assistance  Functional Mobility Comments: Slightly unsteady, no LOB.  Tactile cues for navigation with RW.    Toilet Transfers  Toilet - Technique: Ambulating  Equipment Used: Standard toilet  Toilet Transfer: Minimal assistance  Toilet Transfers Comments: Required use of bilateral grab bars with increased time/effort required. ADL  Feeding: Setup; Independent  Grooming: Contact guard assistance;Setup; Increased time to complete  UE Bathing: Minimal assistance  LE Bathing: Minimal assistance  UE Dressing: Minimal assistance(To tie and close gown)  LE Dressing: Maximum assistance(Pt attempted use of figure four technique, limited d/t pt feeling dizzy. Max A to katya socks. Dizziness subsided with a few minutes before ambulating.)  Toileting: Moderate assistance(Min A for functional sit<>Stand transfer with RW. Pt completed pericare sitting on toielt. Mod A for brief management sitting/standing at toilet.)     Tone RUE  RUE Tone: Normotonic  Tone LUE  LUE Tone: Normotonic    Coordination  Movements Are Fluid And Coordinated: Yes     Bed mobility  Supine to Sit: Moderate assistance(Mod A for trunk progression)  Sit to Supine: Moderate assistance(Mod A for BLE progression)     Transfers  Sit to stand: Minimal assistance  Stand to sit: Minimal assistance  Transfer Comments: Use of RW. Tactile cues provided for proper hand placement.      Cognition  Overall Cognitive Status: WFL        Sensation  Overall Sensation Status: WFL        LUE AROM (degrees)  LUE AROM : WFL  Left Hand AROM (degrees)  Left Hand AROM: WFL  RUE AROM (degrees)  RUE AROM : WFL  Right Hand AROM (degrees)  Right Hand AROM: WFL  LUE Strength  Gross LUE Strength: WFL  L Hand General: 4+/5  LUE Strength Comment: Grossly 4+/5  RUE Strength  Gross RUE Strength: WFL  R Hand General: 4+/5  RUE Strength Comment: Grossly 4+/5     Plan   Plan  Times per week: 2-3x/week  Current Treatment Recommendations: Safety Education & Training, Balance Training, Patient/Caregiver Education & Training, Self-Care / ADL, Functional Mobility Training, Equipment Evaluation, Education, & procurement, Home Management Training, Endurance Training      AM-PAC Score        AM-PAC Inpatient Daily Activity Raw Score: 21 (02/04/21 1347)  AM-PAC Inpatient ADL T-Scale Score : 44.27 (02/04/21 1347)  ADL Inpatient CMS 0-100% Score: 32.79 (02/04/21 1347)  ADL Inpatient CMS G-Code Modifier : Radha Ovalles (02/04/21 1347)    Goals  Short term goals  Time Frame for Short term goals: By discharge, pt will:  Short term goal 1: Demonstrate functional transfers and functional mobility with Mod I, using LRD  Short term goal 2: Demonstrate UB/grooming ADLs with Mod I, setup provided, use of DME/AE PRN  Short term goal 3: Demonstrate LB/toileting ADLs with Min A, setup provided, use of DME/AE PRN  Short term goal 4: Demonstrate +8 minutes of standing balance during functional task with CGA  Short term goal 5: Demonstrate +15 minutes of activity tolerance to increase engagement in ADLs/IADLs       Therapy Time   Individual Concurrent Group Co-treatment   Time In 1010         Time Out 1048         Minutes 38         Timed Code Treatment Minutes: 10 Minutes       David Pelayo OTR/L

## 2021-02-05 NOTE — PROGRESS NOTES
bilateral pleural effusion , hypoxic needing 4 L nasal cannula, elevated D-dimer at 19 and incidental finding of 5.6 cm descending aortic aneurysm along with transaminitis and elevated LD. Up for Covid and came back negative. Patient was transferred to our facility for further management. I saw her later today after she had her thoracentesis , she is a still on 4 L nasal cannula feeling better after the 700 mile or out      Review of Systems:     Review of Systems   Constitutional: Positive for activity change and fatigue. Negative for chills, diaphoresis and fever. HENT: Negative for congestion. Eyes: Negative for visual disturbance. Respiratory: Positive for shortness of breath. Negative for cough, chest tightness and wheezing. Cardiovascular: Negative for chest pain, palpitations and leg swelling. Gastrointestinal: Negative for abdominal pain, blood in stool, constipation, diarrhea, nausea and vomiting. Abdominal distention: better. Genitourinary: Negative for difficulty urinating. Neurological: Positive for weakness. Negative for dizziness, light-headedness, numbness and headaches. All other systems reviewed and are negative. Medications: Allergies:  No Known Allergies    Current Meds:   Scheduled Meds:    carvedilol  6.25 mg Oral BID WC    cefTRIAXone (ROCEPHIN) IV  1,000 mg Intravenous Q24H    sodium chloride flush  10 mL Intravenous 2 times per day    enoxaparin  40 mg Subcutaneous Daily    famotidine  20 mg Oral Daily     Continuous Infusions:     PRN Meds: labetalol, albuterol, sodium chloride flush, hydrALAZINE, potassium chloride **OR** potassium alternative oral replacement **OR** potassium chloride, magnesium sulfate, promethazine **OR** ondansetron, polyethylene glycol, nicotine, acetaminophen **OR** acetaminophen    Data:     Past Medical History:   has a past medical history of Hyperlipidemia, Hypertension, and Thyroid disease.     Social History:   reports that she found for: POCPH, PHART, PH, POCPCO2, SRO2XSP, PCO2, POCPO2, PO2ART, PO2, POCHCO3, VFE0HLM, HCO3, NBEA, PBEA, BEART, BE, THGBART, THB, YBW3WLD, YWMU5ANX, [id number], O2SAT, FIO2  Lab Results   Component Value Date/Time    SPECIAL NOT REPORTED 02/04/2021 12:00 PM     Lab Results   Component Value Date/Time    CULTURE NO GROWTH 18 HOURS 02/04/2021 12:00 PM       Radiology:  Xr Chest (2 Vw)    Result Date: 2/2/2021  Chest: 1. Cardiomegaly with bilateral pleural effusions and bibasilar airspace disease, right greater than left. These findings likely represent CHF related changes. Underlying infiltrate not excluded. Follow-up recommended to document resolution. 2. Underlying COPD. 3. Low lung volume study. Left knee: 1. Tricompartmental osteoarthrosis. Diffuse osteopenia. 2. No acute fracture or dislocation. Xr Knee Left (3 Views)    Result Date: 2/2/2021  Chest: 1. Cardiomegaly with bilateral pleural effusions and bibasilar airspace disease, right greater than left. These findings likely represent CHF related changes. Underlying infiltrate not excluded. Follow-up recommended to document resolution. 2. Underlying COPD. 3. Low lung volume study. Left knee: 1. Tricompartmental osteoarthrosis. Diffuse osteopenia. 2. No acute fracture or dislocation. Ct Abdomen Pelvis W Iv Contrast    Result Date: 2/2/2021  Malignant mass of the pelvis likely ovarian origin, with metastatic disease throughout the peritoneal cavity of the abdomen/pelvis with peritoneal and omental nodularity and thickening, and diffuse ascites. 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 Aneurysm of the descending thoracic aorta measures up to 5.6 cm. Ct Chest Pulmonary Embolism W Contrast    Result Date: 2/3/2021  Evaluation of pulmonary arteries is limited by motion artifact. No convincing evidence for embolism.  Large bilateral pleural effusions with adjacent atelectasis. Large volume ascites within the visualized upper abdomen. Us Thoracentesis Which Side Should The Procedure Be Performed? Left    Result Date: 2/3/2021  Successful ultrasound guided thoracentesis. Us Thoracentesis Which Side Should The Procedure Be Performed? Right    Result Date: 2/3/2021  Successful ultrasound guided thoracentesis. Physical Examination:        Physical Exam  Vitals signs and nursing note reviewed. Constitutional:       General: She is not in acute distress. HENT:      Head: Normocephalic and atraumatic. Eyes:      Conjunctiva/sclera: Conjunctivae normal.      Pupils: Pupils are equal, round, and reactive to light. Cardiovascular:      Rate and Rhythm: Normal rate and regular rhythm. Heart sounds: No murmur. Pulmonary:      Effort: Tachypnea and respiratory distress present. No accessory muscle usage. Breath sounds: No stridor. Examination of the right-lower field reveals decreased breath sounds. Examination of the left-lower field reveals decreased breath sounds. Decreased breath sounds present. No wheezing, rhonchi or rales. Comments: On nasal canula  Abdominal:      General: Bowel sounds are normal. There is no distension (improved after the paracentesis). Palpations: Abdomen is soft. Abdomen is not rigid. Tenderness: There is no abdominal tenderness. There is no guarding. Musculoskeletal:         General: No tenderness. Skin:     General: Skin is warm and dry. Findings: No erythema, lesion or rash. Neurological:      Mental Status: She is alert and oriented to person, place, and time. Cranial Nerves: No cranial nerve deficit. Motor: No seizure activity. Psychiatric:         Speech: Speech normal.         Behavior: Behavior normal. Behavior is cooperative.          Assessment:        Hospital Problems           Last Modified POA    * (Principal) Pelvic mass 2/3/2021 Yes    Pleural effusion,

## 2021-02-05 NOTE — PROGRESS NOTES
Gynecology Progress Note    Date: 2021  Time: 12:42 PM    Niya Nelson is a 72 y.o. female     Patient was seen and examined. She complained of nothing. Abdominal pain is significantly improved following paracentesis. Pain is  controlled. Patient is tolerating oral intake. She is urinating well . She denies any vaginal bleeding. She is  ambulating without difficulty. She is  passing flatus. She denies Fever/Chills, Chest Pain, SOB, N/V, Calf Pain        Vitals:  Vitals:    21 0330 21 0553 21 0715 21 1132   BP: 130/82  132/74 110/76   Pulse: 69  70 78   Resp: 18     Temp:   98.9 °F (37.2 °C) 99.2 °F (37.3 °C)   TempSrc:   Temporal Temporal   SpO2: 97%  95% 95%   Weight:  177 lb 14.6 oz (80.7 kg)           Intake/Output:   Last Shift: I/O last 3 completed shifts: In: 7836 [P.O.:370; I.V.:1015]  Out: -   Current Shift: No intake/output data recorded. Physical Exam:  General:  no apparent distress, alert and cooperative  Neurologic:  alert, oriented, normal speech, no focal findings or movement disorder noted  Lungs:  No increased work of breathing, good air exchange, clear to auscultation bilaterally, no crackles or wheezing  Heart:  regular rate and rhythm and no murmur    Abdomen: Abdomen soft, non-tender. Significant distention.   BS normal. No masses,  No organomegaly  Extremities:  no calf tenderness, non edematous    Lab:  Complete Blood Count:   Recent Labs     21  0712 21  0535 21  1308 21  0633   WBC 6.6 6.1  --  7.0   HGB 13.9 14.0 14.3 13.4   HCT 45.8 45.9 47.1 45.6    295  --  265        PT/INR:    Lab Results   Component Value Date    PROTIME 14.5 2021    INR 1.4 2021     PTT:    Lab Results   Component Value Date    APTT 26.3 2021       Comprehensive Metabolic Profile:   Recent Labs     21  1300 21  0020 21  0712 21  1225 21  0535 21  0633     --  135  --  135 136   K 4.2  --  4.0  --  3.8 3.7   CL 94*  --  97*  --  97* 100   CO2 27  --  26  --  28 27   BUN 22  --  22  --  19 14   CREATININE 1.02*  --  0.81  --  0.69 0.50   GLUCOSE 125*  --  90  --  110* 79   CALCIUM 8.8  --  8.8  --  8.4* 8.3*   PROT 8.4* 7.9  --   --   --   --    LABALBU 3.0*  --   --  2.9*  --   --    BILITOT 0.75  --   --   --   --   --    ALKPHOS 134*  --   --   --   --   --    AST 19  --   --   --   --   --    ALT 8  --   --   --   --   --         Assessment/Plan:  Rene Chowdhury 72 y.o. female    - Doing well, vitals stable    Pelvic mass (13x5cm)   - Plan for chemotherapy followed by debulking surgery    - Cytology pending    - Mediport placed by IR    - S/p paracentesis and thoracentesis    -  reviewed and 616, CEA wnl       AAA    - Will discuss plan for chemotherapy with vascular surgery     Hypoxia     - S/p b/l thoracentesis w/ 700ml from R and 450ml from left   - Cytology pending     Patient is cleared for DC from a gyn onc standpoint and should follow up in office to discuss further treatment       Patient Active Problem List    Diagnosis Date Noted    Malignant pleural effusion     Pleural effusion, bilateral 2021    Abdominal ascites 2021    Thoracic aortic aneurysm without rupture ([address] 2021    Hearing impaired 2021    Hypoxia 2021    Malignant neoplasm of left ovary ([address] 2021    Acute hypoxemic respiratory failure ([address] 2021    Elevated d-dimer 2021    Hyperlipidemia     Hypertension     Thyroid disease     Pelvic mass 2021         Hamida Molina  Ob/Gyn Resident  Pager: [phone]  2021, 12:42 PM

## 2021-02-05 NOTE — PROGRESS NOTES
distention  Constitutional: No fever or chills. No night sweats, weight gain and weakness. Eyes: No eye discharge, double vision, or eye pain   HEENT: negative for sore mouth, sore throat, hoarseness and voice change   Respiratory: negative for cough , sputum, dyspnea, wheezing, hemoptysis, chest pain   Cardiovascular: negative for chest pain, dyspnea, palpitations, orthopnea, PND   Gastrointestinal: Abdominal discomfort, abdominal distention  Genitourinary: negative for frequency, dysuria, nocturia, urinary incontinence, and hematuria   Integument: negative for rash, skin lesions, bruises.    Hematologic/Lymphatic: negative for easy bruising, bleeding, lymphadenopathy, or petechiae   Endocrine: negative for heat or cold intolerance,weight changes, change in bowel habits and hair loss   Musculoskeletal: negative for myalgias, arthralgias, pain, joint swelling,and bone pain   Neurological: negative for headaches, dizziness, seizures, weakness, numbness    PHYSICAL EXAM:      /74   Pulse 73   Temp 99.2 °F (37.3 °C) (Temporal)   Resp 16   Wt 177 lb 14.6 oz (80.7 kg)   SpO2 99%    Temp (24hrs), Av.7 °F (37.1 °C), Min:98.3 °F (36.8 °C), Max:99.2 °F (37.3 °C)    General appearance -ill-appearing female  Mental status - alert and cooperative   Eyes - pupils equal and reactive, extraocular eye movements intact   Ears - bilateral TM's and external ear canals normal   Mouth - mucous membranes moist, pharynx normal without lesions   Neck - supple, no significant adenopathy   Lymphatics - no palpable lymphadenopathy, no hepatosplenomegaly   Chest -creased air entry with both bases  Heart - normal rate, regular rhythm, normal S1, S2, no murmurs  Abdomen -distended with ascites  Neurological - alert, oriented, normal speech, no focal findings or movement disorder noted   Musculoskeletal - no joint tenderness, deformity or swelling   Extremities - peripheral pulses normal, no pedal edema, no clubbing or cyanosis Skin - normal coloration and turgor, no rashes, no suspicious skin lesions noted ,    DATA:    Labs:   CBC:   Recent Labs     02/04/21  0535 02/04/21  1308 02/05/21  0633   WBC 6.1  --  7.0   HGB 14.0 14.3 13.4   HCT 45.9 47.1 45.6     --  265     BMP:   Recent Labs     02/04/21  0535 02/05/21  0633    136   K 3.8 3.7   CO2 28 27   BUN 19 14   CREATININE 0.69 0.50   LABGLOM >60 >60   GLUCOSE 110* 79     PT/INR:   Recent Labs     02/04/21  0535 02/05/21  0633   PROTIME 18.4* 14.5*   INR 1.8 1.4       IMAGING DATA:      Primary Problem  Pelvic mass    Active Hospital Problems    Diagnosis Date Noted    Malignant pleural effusion [J91.0]     Pleural effusion, bilateral [J90] 02/03/2021    Abdominal ascites [R18.8] 02/03/2021    Thoracic aortic aneurysm without rupture ([facility] [address] [I71.2] 02/03/2021    Hearing impaired [H91.90] 02/03/2021    Hypoxia [R09.02] 02/03/2021    Malignant neoplasm of left ovary (HCC) [C56.2] 02/03/2021    Acute hypoxemic respiratory failure (HCC) [J96.01] 02/03/2021    Elevated d-dimer [R79.89] 02/03/2021    Hyperlipidemia [E78.5]     Hypertension [I10]     Thyroid disease [E07.9]     Pelvic mass [R19.00] 02/02/2021         IMPRESSION:   1. Pelvic mass  2. Abdominal ascites and carcinomatosis  3. Pleural effusion  4. The picture is highly suggestive of with advanced ovarian cancer    RECOMMENDATIONS:  1. Explaining the imaging study and the fact that her cancer marker is elevated. Pleural tap positive for malignancy. Explained to patient. 2. The plan for chemotherapy with carboplatin and Taxol to be followed likely by debulking surgery  3. Genetic counseling. 4. Considering her family history, I am highly suspicious that she has a genetic disorder.   We will arrange for genetic counselor to see her as an outpatient  Plan for chemotherapy, scheduled for Monday at 80 in our office at [place] [id number] to discharge from my perspective when okay with other teams  Please call with any questions    Theflacaor Bowen Delgado MD  Hematologist/Medical [address] [facility] hematology oncology physicians                                 This note is created with the assistance of a speech recognition program.  While intending to generate a document that actually reflects the content of the visit, the document can still have some errors including those of syntax and sound a like substitutions which may escape proof reading. It such instances, actual meaning can be extrapolated by contextual diversion.

## 2021-02-05 NOTE — PROGRESS NOTES
Occupational Therapy    Occupational Therapy Not Seen Note    DATE: 2021  Name: Tabitha Garcia  : 1950  MRN: [id number]    Patient not available for Occupational Therapy due to:    Surgery/Procedure: IR for port placement        Electronically signed by BERNIE Reyes on 2021 at 1:47 PM

## 2021-02-05 NOTE — FLOWSHEET NOTE
DATE: 2021    NAME: Georgi Rodriguez  MRN: [id number]   : 1950      Patient not seen this date for Physical Therapy due to:    Surgery/Procedure: Pt off floor down in IR for port placement.       Electronically signed by Omari Butterfield PTA on 2021 at 12:27 PM

## 2021-02-05 NOTE — PLAN OF CARE
Problem: Falls - Risk of:  Goal: Will remain free from falls  Description: Will remain free from falls  2/5/2021 0056 by Nova Jacob RN  Outcome: Ongoing     Problem: Falls - Risk of:  Goal: Absence of physical injury  Description: Absence of physical injury  2/5/2021 0056 by Nova Jacob RN  Outcome: Ongoing     Problem: Skin Integrity:  Goal: Will show no infection signs and symptoms  Description: Will show no infection signs and symptoms  Outcome: Ongoing     Problem: Skin Integrity:  Goal: Absence of new skin breakdown  Description: Absence of new skin breakdown  2/5/2021 0056 by Nova Jacob RN  Outcome: Ongoing     Problem:  Activity:  Goal: Ability to tolerate increased activity will improve  Description: Ability to tolerate increased activity will improve  2/5/2021 0056 by Nova Jacob RN  Outcome: Ongoing     Problem: Cardiac:  Goal: Hemodynamic stability will improve  Description: Hemodynamic stability will improve  Outcome: Ongoing    Problem: Musculor/Skeletal Functional Status  Goal: Highest potential functional level  2/5/2021 0056 by Nova Jacob RN  Outcome: Ongoing

## 2021-02-05 NOTE — CONSULTS
PULMONARY & CRITICAL CARE MEDICINE CONSULT NOTE     Patient:  Shashi Vasquez  MRN: [id number]  Admit date: 2/2/2021  Primary Care Physician: No primary care provider on file. Consulting Physician: Hillary Dominguez MD    HISTORY     CHIEF COMPLAINT/REASON FOR CONSULT:    HISTORY OF PRESENT ILLNESS:  The patient is a 72 y.o. female with past medical history of hypertension, hyperlipidemia, thyroid disease who initially presented to [facility] AND [facility] and emergency department with generalized weakness. Patient has hearing impairment and uses  to communicate. She endorsed recent falls over the last 2 to 3 months. Difficulties with balance. Also reported difficulty walking with generalized weakness progressing over the last 2 to 3 weeks. Accompanied with significant abdominal bloating over the last 2 to 3 weeks with anorexia and dyspnea. Physical activities pertaining to bending causing symptoms to worsen. She has not followed up with a PCP in several long years. Significant history of smoking. However she denied any pelvic or urinary complaints. Initial work-up with CT angiogram obtained was concerning for large right pelvic mass likely ovarian in nature and with metastasis to peritoneum and omentum with omental thickening accompanied with abdominal ascites that was extensive and large dependent pleural effusions right more than left with compressive atelectasis. Also findings of 5.6 cm ascending thoracic aortic aneurysm. Hematology was consulted for the patient. She underwent thoracentesis on 2/3 and paracentesis on 2/4. Nicholes Coca Results consistent with exudative effusion. Likely due to the underlying ovarian malignancy. Upon current evaluation her oxygen requirements have increased from 4 L to 6 L. She has wheezing in the bilateral upper and lower lobes. Patient is going for her port placement today for chemotherapy.     PAST MEDICAL HISTORY:        Diagnosis Date    Hyperlipidemia     Hypertension     Thyroid disease      PAST SURGICAL HISTORY:    History reviewed. No pertinent surgical history. FAMILY HISTORY:   History reviewed. No pertinent family history. SOCIAL HISTORY:   TOBACCO:   reports that she has quit smoking. She does not have any smokeless tobacco history on file. ETOH:  reports previous alcohol use. DRUGS: reports no history of drug use. AVOCATION/OCCUPATIONAL EXPOSURE HISTORY:    There is no  history of exposure to asbestos or silica dust.  The patient denies coal, foundry, quarry or [employer] exposure. The patient does not have any new pet dogs, cats, turtles or exotic birds at home. There is no history of TB or TB exposure. The patient denies using Hot Tubs. There is no exposure to sick contacts. Travel history is not significant history of risk factors for pulmonary disease. ALLERGIES:    No Known Allergies      HOME MEDICATIONS:  Prior to Admission medications    Not on File     IMMUNIZATIONS:    There is no immunization history on file for this patient.     REVIEW OF SYSTEMS:  General: negative for chills, fatigue or fever  ENT: negative for headaches, nasal congestion, sore throat or visual changes  Allergy and Immunology: negative for postnasal drip or seasonal allergies  Hematological and Lymphatic: negative for bleeding problems, swollen lymph nodes  Respiratory: positive for cough, shortness of breath, tachypnea and wheezing negative for hemoptysis  Cardiovascular: negative for edema or palpitations  Gastrointestinal: negative for abdominal pain, change in bowel habits or nausea/vomiting  Genito-Urinary: negative for dysuria or urinary frequency/urgency  Musculoskeletal: negative for joint pain or joint swelling  Neurological: negative for numbness/tingling, seizures or weakness  Dermatological: negative for pruritus or rash    PHYSICAL EXAMINATION     VITAL SIGNS:   /74   Pulse 70   Temp 98.9 °F (37.2 °C) (Temporal)   Resp 21   Wt 177 lb 14.6 oz (80.7 kg)   SpO2 95%     SYSTEMIC EXAMINATION:   General appearance - alert, well appearing, and in no distress  Mental status - normal mood, behavior, speech, dress, motor activity, and thought processes  Eyes - pupils equal and reactive, extraocular eye movements intact  Mouth - mucous membranes moist, pharynx normal without lesions  Neck - supple, no significant adenopathy  Chest -breath sounds bilaterally decreased. Scattered wheezes noted. Heart - normal rate, regular rhythm, normal S1, S2, no murmurs, rubs, clicks or gallops  Abdomen -gross abdominal distention noted. Neurological - screening mental status exam normal}  Extremities - peripheral pulses normal, no pedal edema, no clubbing or cyanosis  Skin - normal coloration and turgor, no rashes, no suspicious skin lesions noted     DATA REVIEW     Medications: Current Inpatient  Scheduled Meds:   carvedilol  6.25 mg Oral BID WC    cefTRIAXone (ROCEPHIN) IV  1,000 mg Intravenous Q24H    sodium chloride flush  10 mL Intravenous 2 times per day    enoxaparin  40 mg Subcutaneous Daily    famotidine  20 mg Oral Daily     Continuous Infusions:   sodium chloride 20 mL/hr at 02/05/21 0956     INPUT/OUTPUT:  In: 4112 [P.O.:370;  I.V.:1015]  Out: -     Ventilator Settings:  Vent Information  Skin Assessment: Clean, dry, & intact  FiO2 : 35 %  SpO2: 95 %  I Time/ I Time %: 0.9 s  Mask Type: Full face mask  Mask Size: Large  Additional Respiratory  Assessments  Pulse: 70  Resp: 21  SpO2: 95 %  Oral Care: Teeth brushed, Mouthwash, Lip moisturizer applied  No results found for: IFIO2, MODE, SETTIDVOL, SETPEEP    CBC:   Recent Labs     02/02/21  1300 02/03/21  0712 02/04/21  0535 02/04/21  1308 02/05/21  0633   WBC 10.0 6.6 6.1  --  7.0   HGB 15.4* 13.9 14.0 14.3 13.4   HCT 50.1* 45.8 45.9 47.1 45.6   MCV 78.9* 79.5* 78.9*  --  80.9*    324 295  --  265   LYMPHOPCT 14*  --   --   --   --    RBC 6.35* 5.76* 5.82*  --  5.64*   MCH 24.3* 24.1* 24.1*  --  23.8*   MCHC 30.7 30.3 30. 5  --  29.4   RDW 17.0* 16.0* 16.1*  --  15.9*     BMP:   Recent Labs     02/03/21  0712 02/04/21  0535 02/05/21  0633    135 136   K 4.0 3.8 3.7   CL 97* 97* 100   CO2 26 28 27   BUN 22 19 14   CREATININE 0.81 0.69 0.50   GLUCOSE 90 110* 79     Liver Function Test:   Recent Labs     02/02/21  1300 02/03/21  0020 02/03/21  1225   PROT 8.4* 7.9  --    LABALBU 3.0*  --  2.9*   ALT 8  --   --    AST 19  --   --    ALKPHOS 134*  --   --    BILITOT 0.75  --   --      Amylase/Lipase:  Recent Labs     02/02/21  1300   AMYLASE 95   LIPASE 33     Coagulation Profile:   Recent Labs     02/02/21  1300 02/03/21  0712 02/04/21  0535 02/05/21  0633   INR 1.7 1.8 1.8 1.4   PROTIME 19.4* 18.8* 18.4* 14.5*   APTT 26.3  --   --   --      Cardiac Enzymes:  No results for input(s): CKTOTAL, CKMB, CKMBINDEX, TROPONINI in the last 72 hours. Lactic Acid:  No results found for: LACTA  BNP:   No results found for: BNP  D-Dimer:  Lab Results   Component Value Date    DDIMER 15.02 02/03/2021     Others:   Lab Results   Component Value Date    TSH 0.86 02/03/2021     No results found for: MISA, RHEUMFACTOR, SEDRATE, CRP  No results found for: Jerryl Homans  No results found for: IRON, TIBC, FERRITIN  No results found for: SPEP, UPEP  Lab Results   Component Value Date    CEA 1.4 02/03/2021     616 02/03/2021     Microbiology: Reviewed as available     Pathology: Reviewed as available    Radiology: Reviewed as available        ASSESSMENT AND PLAN     Assessment:    //Pelvic/ovarian mass. Picture suggestive of advanced ovarian cancer.  //Abdominal ascites and carcinomatosis  //Bilateral pleural effusions. Status post thoracentesis. Plan:  Patient has a picture highly suggestive of advanced ovarian cancer. [facility] oncology is following the patient. Plan for chemotherapy port placement today. Patient remains hemodynamically stable and is currently saturating well on nasal cannula.   Although her oxygen requirements have increased from 4 L to 6 L. She is able to speak in complete sentences. We will keep a close eye for respiratory decompensation. Continue supplemental oxygen to keep oxygen saturation greater than 92%  Continue nocturnal and as needed noninvasive mechanical ventilation (BiPAP)anagement  Pleural fluid pictures suggestive of exudate due to underlying malignancy. Anticoagulation held in view of chemotherapy port placement. Continue incentive spirometry, pulmonary toilet, aspiration precautions and bronchodilators  Continue to monitor I/O with a goal of even/negative fluid balance  Antimicrobials reviewed; continue  Physical/occupational therapy; increase activity as tolerated. It was my pleasure to evaluate Georgi Rodriguez today. We will continue to follow. I would like to thank you for allowing me to participate in the care of this patient. Please feel free to call with any further questions or concerns. Coley Spatz, M.D. Internal Medicine Resident , PGY-3  [address] [place] [address]  02/05/21 10:31 AM   Attending Physician Statement  I have discussed the care of Georgi Rodriguez, including pertinent history and exam findings,  with the resident. I have seen and examined the patient and the key elements of all parts of the encounter have been performed by me. I agree with the assessment, plan and orders as documented by the resident with additions . Treatment plan Discussed with nursing staff in detail , all questions answered . Electronically signed by Giana Key MD on   2/5/21 at 12:25 PM EST    Please note that this chart was generated using voice recognition Dragon dictation software. Although every effort was made to ensure the accuracy of this automated transcription, some errors in transcription may have occurred. Please note that this chart was generated using voice recognition Dragon dictation software.   Although every effort was made to ensure the accuracy of

## 2021-02-05 NOTE — BRIEF OP NOTE
Brief Postoperative Note for [facility]    Walt Pickering  YOB: 1950  [id number]    Pre-operative Diagnosis: Pelvic mass      Post-operative Diagnosis: Same    Procedure: Steven Morin 8 Fr Port Placement    Medication Given:  fentanyl and  midazolam    Anesthesia: 1 %Lidocaine, 1% Lidocaine w/ Epi    Surgeons/Assistants: Addy Ford    Estimated Blood Loss: Minimal    Complications: none    Xcela 8 Fr Port placement into the right IJ. Port flushed with heparin. Incision site closed with Vicryl sutures. Dressing applied. Vital signs were reviewed and were stable after the procedure. Patient tolerated the procedure well.     Electronically signed by Barak Smith PA-C on 2/5/2021 at 1:45 PM

## 2021-02-05 NOTE — FLOWSHEET NOTE
Assessment: The patient can not hear,  wrote on clipboard to communicate. Intervention: The  nurtured hope. Outcome: The patient was thankful for the visit. 02/05/21 1700   Encounter Summary   Services provided to: Patient   Referral/Consult From: [facility]   (02/05/21)   Complexity of Encounter Moderate   Length of Encounter 15 minutes   Spiritual Assessment Completed Yes   Routine   Type Initial   Assessment Calm; Approachable   Intervention Sustaining presence/ Ministry of presence;Nurtured hope   Outcome Expressed gratitude

## 2021-02-05 NOTE — PROGRESS NOTES
Today's Date: 2/4/2021  Patient Name: Eh Jacobson  Date of admission: 2/2/2021 11:17 PM  Patient's age: 72 y.o., 1950  Admission Dx: Pelvic mass [R19.00]    Reason for Consult: management recommendations  Requesting Physician: Jac Prieto MD    CHIEF COMPLAINT:  Ovarian mass, ascites     SUBJECTIVE:  . The patient was seen and examined. Doing well clinically. Mild abdominal distention. No fever. No active bleeding. BRIEF CASE HISTORY:    The patient is a 72 y.o.  female who is admitted to the hospital for rest of weakness and abdominal distention. She underwent CT scan that showed abdominal carcinomatosis with ascites and pleural effusion as well as omental caking and large pelvic mass  The patient underwent pleural tap today and we are awaiting results. The patient is deaf and all information were obtained from the patient through an . Family is at bedside and also participating in the conversation. Seems that she has been suffering from worsening abdominal discomfort and progressive weakness over the last few months. The patient did not see any medical doctor for the last 5 years    Past Medical History:   has a past medical history of Hyperlipidemia, Hypertension, and Thyroid disease. Past Surgical History:   has no past surgical history on file. Medications:    Reviewed in Epic     Allergies:  Patient has no known allergies. Social History:   reports that she has quit smoking. She does not have any smokeless tobacco history on file. She reports previous alcohol use. She reports that she does not use drugs. Family History: The mother had breast cancer and that the father had bladder cancer  REVIEW OF SYSTEMS: Difficult to obtain. Progressive weakness, abdominal distention  Constitutional: No fever or chills. No night sweats, weight gain and weakness.   Eyes: No eye discharge, double vision, or eye pain   HEENT: negative for sore mouth, sore throat, hoarseness and voice change   Respiratory: negative for cough , sputum, dyspnea, wheezing, hemoptysis, chest pain   Cardiovascular: negative for chest pain, dyspnea, palpitations, orthopnea, PND   Gastrointestinal: Abdominal discomfort, abdominal distention  Genitourinary: negative for frequency, dysuria, nocturia, urinary incontinence, and hematuria   Integument: negative for rash, skin lesions, bruises.    Hematologic/Lymphatic: negative for easy bruising, bleeding, lymphadenopathy, or petechiae   Endocrine: negative for heat or cold intolerance,weight changes, change in bowel habits and hair loss   Musculoskeletal: negative for myalgias, arthralgias, pain, joint swelling,and bone pain   Neurological: negative for headaches, dizziness, seizures, weakness, numbness    PHYSICAL EXAM:      /73   Pulse 75   Temp 98.6 °F (37 °C) (Temporal)   Resp 18   Wt 202 lb 13.2 oz (92 kg)   SpO2 98%    Temp (24hrs), Av.1 °F (36.7 °C), Min:97.1 °F (36.2 °C), Max:98.6 °F (37 °C)    General appearance -ill-appearing female  Mental status - alert and cooperative   Eyes - pupils equal and reactive, extraocular eye movements intact   Ears - bilateral TM's and external ear canals normal   Mouth - mucous membranes moist, pharynx normal without lesions   Neck - supple, no significant adenopathy   Lymphatics - no palpable lymphadenopathy, no hepatosplenomegaly   Chest -creased air entry with both bases  Heart - normal rate, regular rhythm, normal S1, S2, no murmurs  Abdomen -distended with ascites  Neurological - alert, oriented, normal speech, no focal findings or movement disorder noted   Musculoskeletal - no joint tenderness, deformity or swelling   Extremities - peripheral pulses normal, no pedal edema, no clubbing or cyanosis   Skin - normal coloration and turgor, no rashes, no suspicious skin lesions noted ,    DATA:    Labs:   CBC:   Recent Labs     21  0712 21  0535 21  1308   WBC 6.6 6.1  --    HGB 13.9 14.0 14.3   HCT 45.8 45.9 47.1    295  --      BMP:   Recent Labs     02/03/21  0712 02/04/21  0535    135   K 4.0 3.8   CO2 26 28   BUN 22 19   CREATININE 0.81 0.69   LABGLOM >60 >60   GLUCOSE 90 110*     PT/INR:   Recent Labs     02/03/21  0712 02/04/21  0535   PROTIME 18.8* 18.4*   INR 1.8 1.8       IMAGING DATA:      Primary Problem  Pelvic mass    Active Hospital Problems    Diagnosis Date Noted    Pleural effusion, bilateral [J90] 02/03/2021    Abdominal ascites [R18.8] 02/03/2021    Thoracic aortic aneurysm without rupture ([facility] [address] [I71.2] 02/03/2021    Hearing impaired [H91.90] 02/03/2021    Hypoxia [R09.02] 02/03/2021    Malignant neoplasm of left ovary (HCC) [C56.2] 02/03/2021    Acute hypoxemic respiratory failure (HCC) [J96.01] 02/03/2021    Elevated d-dimer [R79.89] 02/03/2021    Hyperlipidemia [E78.5]     Hypertension [I10]     Thyroid disease [E07.9]     Pelvic mass [R19.00] 02/02/2021         IMPRESSION:   1. Pelvic mass  2. Abdominal ascites and carcinomatosis  3. Pleural effusion  4. The picture is highly suggestive of with advanced ovarian cancer    RECOMMENDATIONS:  1. Explaining the imaging study and the fact that her cancer marker is elevated. Pleural tap positive for malignancy. Explained to patient. 2. The plan for chemotherapy with carboplatin and Taxol to be followed likely by debulking surgery  3. Genetic counseling. 4. Considering her family history, I am highly suspicious that she has a genetic disorder. We will arrange for genetic counselor to see her as an outpatient  5. Patient will need a Mediport as an outpatient  6.  We will follow closely with you                            Keyonna [facility]tega [facility]                            This note is created with the assistance of a speech recognition program.  While intending to generate a document that actually reflects the content of the visit, the document can still have

## 2021-02-05 NOTE — PROGRESS NOTES
200- Confirmed with Doctor Delgado that we will attempt to get chemotherapy port placed in patient if allowed. Plan is to get today. 46- Spoke with nurse at Doctor Delgado office. They are planning on starting chemotherapy on Monday at 930. Doctor Sammie Goode notified.

## 2021-02-06 NOTE — PROGRESS NOTES
PULMONARY & CRITICAL CARE MEDICINE PROGRESS  NOTE     Patient:  Erna Medeiros  MRN: [id number]  Admit date: 2/2/2021    SUBJECTIVE     I personally interviewed/examined the patient; reviewed interval history, interpreted all available radiographic, laboratory data at the time of service. Chief Complaint/Reason for Initial Consult: Hypoxic respiratory failure secondary to pleural effusion from ovarian cancer. Interval history. 2/6/2021  Patient seen and examined bedside. No acute events overnight. Her oxygen requirements improved. She did not require oxygen overnight. She got her port placed yesterday. Due to her respiratory decompensation during the hospital stay she was evaluated for home oxygen. However she maintained her saturations and did not qualify for home oxygen. She is scheduled for outpatient chemotherapy with Dr. Masha Robert at [facility]. She was evaluated by Dr. Macey Cordon with GYN oncology. Need for peritoneal catheter was discussed. No need for placement of peritoneal catheter at this point of time as discussed between heme oncology and gynecology. At the time of examination of the patient she does have ascites. She recently underwent paracentesis with removal of 10.8 L. She was hypotensive after removal of the fluid and required albumin. She would likely require albumin replacement if she goes for paracentesis again as she quickly becomes hypotensive. She had bilateral thoracentesis  with 700 mL out from the right sacral for 50 mL out from the left side. Her thoracentesis revealed LDH of 598 and albumin of 2.4. Total WBC count 598. Exudative effusion secondary to malignancy. HISTORY OF PRESENT ILLNESS:  The patient is a 72 y.o. female with past medical history of hypertension, hyperlipidemia, thyroid disease who initially presented to [facility] and emergency department with generalized weakness.   Patient has hearing impairment and uses  to communicate. She endorsed recent falls over the last 2 to 3 months. Difficulties with balance. Also reported difficulty walking with generalized weakness progressing over the last 2 to 3 weeks. Accompanied with significant abdominal bloating over the last 2 to 3 weeks with anorexia and dyspnea. Physical activities pertaining to bending causing symptoms to worsen. She has not followed up with a PCP in several long years. Significant history of smoking. However she denied any pelvic or urinary complaints. Initial work-up with CT angiogram obtained was concerning for large right pelvic mass likely ovarian in nature and with metastasis to peritoneum and omentum with omental thickening accompanied with abdominal ascites that was extensive and large dependent pleural effusions right more than left with compressive atelectasis. Also findings of 5.6 cm ascending thoracic aortic aneurysm. Hematology was consulted for the patient. She underwent thoracentesis on 2/3 and paracentesis on 2/4. Giana Severance Results consistent with exudative effusion. Likely due to the underlying ovarian malignancy.     Upon current evaluation her oxygen requirements have increased from 4 L to 6 L. She has wheezing in the bilateral upper and lower lobes. Patient is going for her port placement today for chemotherapy.     REVIEW OF SYSTEMS:  General: negative for chills, fatigue or fever  ENT: negative for headaches, nasal congestion, sore throat or visual changes  Allergy and Immunology: negative for postnasal drip or seasonal allergies  Hematological and Lymphatic: negative for bleeding problems, swollen lymph nodes  Respiratory: negative today  for cough, shortness of breath, tachypnea and wheezing negative for hemoptysis  Cardiovascular: negative for edema or palpitations  Gastrointestinal: negative for abdominal pain, change in bowel habits or nausea/vomiting  Genito-Urinary: negative for dysuria or urinary frequency/urgency  Musculoskeletal: negative for joint pain or joint swelling  Neurological: negative for numbness/tingling, seizures or weakness  Dermatological: negative for pruritus or rash      OBJECTIVE     Respiratory Support:  Vent Information  Skin Assessment: Clean, dry, & intact  FiO2 : 35 %  SpO2: 90 %  I Time/ I Time %: 0.9 s  Mask Type: Full face mask  Mask Size: Large  No results found for: IFIO2, MODE, SETTIDVOL, SETPEEP  O2 Flow Rate (L/min): 2 L/min  SpO2: 90 %    Vitals:   /82   Pulse 68   Temp 97.3 °F (36.3 °C) (Oral)   Resp 22   Wt 184 lb 1.4 oz (83.5 kg)   SpO2 90%     SYSTEMIC EXAMINATION:   General appearance - alert, well appearing, and in no distress  Mental status - normal mood, behavior, speech, dress, motor activity, and thought processes  Eyes - pupils equal and reactive, extraocular eye movements intact  Mouth - mucous membranes moist, pharynx normal without lesions  Neck - supple, no significant adenopathy  Chest -breath sounds bilaterally decreased. Scattered wheezes noted. Heart - normal rate, regular rhythm, normal S1, S2, no murmurs, rubs, clicks or gallops  Abdomen -gross abdominal distention noted. Neurological - screening mental status exam normal  Extremities - peripheral pulses normal, no pedal edema, no clubbing or cyanosis  Skin - normal coloration and turgor, no rashes, no suspicious skin lesions noted     DATA REVIEW     Medications:  Scheduled Meds:    Continuous Infusions:    LABS:-  ABGs:   No results found for: PH, PCO2, PO2, HCO3, O2SAT  No results for input(s): PHART, PO2ART, VZM5XAY, GFM1SAK, BEART, U7DKHYRN in the last 72 hours.   No results found for: POCPH, POCPCO2, POCPO2, POCHCO3, UPIM9YUX  CBC:   Recent Labs     02/04/21  0535 02/04/21  1308 02/05/21  0633   WBC 6.1  --  7.0   HGB 14.0 14.3 13.4   HCT 45.9 47.1 45.6   MCV 78.9*  --  80.9*     --  265   RBC 5.82*  --  5.64*   MCH 24.1*  --  23.8*   MCHC 30.5  --  29.4   RDW 16.1*  -- 15.9*     BMP:   Recent Labs     02/04/21  0535 02/05/21  0633    136   K 3.8 3.7   CL 97* 100   CO2 28 27   BUN 19 14   CREATININE 0.69 0.50   GLUCOSE 110* 79     Coagulation Profile:   Recent Labs     02/04/21  0535 02/05/21  0633   INR 1.8 1.4   PROTIME 18.4* 14.5*     D-Dimer:  Lab Results   Component Value Date    DDIMER 15.02 02/03/2021     Others:   Lab Results   Component Value Date    TSH 0.86 02/03/2021       Lab Results   Component Value Date    CEA 1.4 02/03/2021     616 02/03/2021       Input/Output:    Intake/Output Summary (Last 24 hours) at 2/6/2021 1647  Last data filed at 2/6/2021 0800  Gross per 24 hour   Intake 570 ml   Output --   Net 570 ml       Microbiology: Reviewed as available      Pathology: Reviewed as available     Radiology: Reviewed as available       ASSESSMENT AND PLAN     Assessment:     //Pelvic/ovarian mass. Picture suggestive of advanced ovarian cancer. Ca 125 -616  //Abdominal ascites and carcinomatosis  //Bilateral pleural effusions. Status post thoracentesis. Exudative effusion  //Acute hypoxic respiratory failure secondary to bilateral pleural effusions. Improved.  [place] ascites. Status post paracentesis. 10.8 L removed. Required albumin supplementation to maintain volume status       Plan:  Patient has a picture highly suggestive of advanced ovarian cancer. [facility] oncology is following the patient. Patient received chemotherapy port placement. She is scheduled for chemotherapy with carboplatin and Taxol at [facility] to be followed likely by debulking surgery  She will likely require genetic counseling given her family history. She is arranged for following up with genetic counselor as outpatient. She did not require placement of peritoneal catheter per discussion with Dr. Sanjay Nova and heme oncology. After paracentesis she was hypotensive and did require bolus fluids. However her blood pressure has maintained.   No further requirement of fluids. She tolerated oral well. She was evaluated for home O2. However she did not require home oxygen. Plan per primary to discharge the patient and follow-up outpatient with heme oncology and gynecology for beginning of chemotherapy. Given her metastatic disease-prognosis remains guarded. She has a low threshold for recurrence of pleural effusion and respiratory failure. Continue to monitor I/O with a goal of even/negative fluid balance  Physical/occupational therapy; increase activity as tolerated. It was my pleasure to evaluate Renee Adler today. We will continue to follow. I would like to thank you for allowing me to participate in the care of this patient. Please feel free to call with any further questions or concerns. Kemi Burton M.D. Internal Medicine Resident , PGY-3  [facility]    Please note that this chart was generated using voice recognition Dragon dictation software. Although every effort was made to ensure the accuracy of this automated transcription, some errors in transcription may have occurred. Attending Physician Statement  I have discussed the care of Renee Adler, including pertinent history and exam findings with the resident. I have reviewed the key elements of all parts of the encounter with the resident. I have seen and examined the patient with the resident. I agree with the assessment and plan and status of the problem list as documented. I seen the patient this morning during rounds and I have reviewed the chart, other notes seen, labs and fluid studies seen. Patient was on oxygen until this morning she was on 2 L and she was maintaining saturation. Her chest x-ray on 02/04/2021 as compared to 02/02/2021 showed improvement in bilateral pleural effusion but a small bilateral effusion was present.   She did respond to large volume paracentesis also with improvement in overall condition and also her respiratory status as most likely causing her to have bilateral diaphragm elevation because of massive ascites. She does not complain of cough shortness of breath is better. She has bilateral decreased breath sound at bases. Her pleural fluid on the right side was exudative and positive for malignancy no cytology available for left-sided pleural fluid and was exudative, cultures are negative for both. Acetic fluid was positive for malignancy  She had home O2 evaluation done today and she did not need home oxygen  Okay to discharge from pulmonary standpoint. Follow-up with GYN oncology and oncology. The reaccumulation of the abdominal fluid and pleural fluid will depend on the response to chemotherapy as she has high risk for worsening ascites and pleural effusion secondary to malignancy. Discussed with nursing staff, treatment and plan discussed. Please note that this chart was generated using voice recognition Dragon dictation software. Although every effort was made to ensure the accuracy of this automated transcription, some errors in transcription may have occurred.      Kacy Munguia MD  2/6/2021 8:36 PM

## 2021-02-06 NOTE — PROGRESS NOTES
Today's Date: 2/6/2021  Patient Name: Cristina Carrera  Date of admission: 2/2/2021 11:17 PM  Patient's age: 72 y.o., 1950  Admission Dx: Pelvic mass [R19.00]    Reason for Consult: management recommendations  Requesting Physician: Katie Seymour MD    CHIEF COMPLAINT:  Ovarian mass, ascites     SUBJECTIVE:  . The patient was seen and examined. Mediport was inserted. She is scheduled for chemotherapy to be done on Monday in the outpatient setting. Case was discussed with Dr. Jami Cui, no need for peritoneal catheter  The patient verbalized understanding of her situation and the fact that pleural cytology was positive    BRIEF CASE HISTORY:    The patient is a 72 y.o.  female who is admitted to the hospital for rest of weakness and abdominal distention. She underwent CT scan that showed abdominal carcinomatosis with ascites and pleural effusion as well as omental caking and large pelvic mass  The patient underwent pleural tap today and we are awaiting results. The patient is deaf and all information were obtained from the patient through an . Family is at bedside and also participating in the conversation. Seems that she has been suffering from worsening abdominal discomfort and progressive weakness over the last few months. The patient did not see any medical doctor for the last 5 years    Past Medical History:   has a past medical history of Hyperlipidemia, Hypertension, and Thyroid disease. Past Surgical History:   has a past surgical history that includes IR PORT PLACEMENT > 5 YEARS (2/5/2021). Medications:    Reviewed in Epic     Allergies:  Patient has no known allergies. Social History:   reports that she has quit smoking. She does not have any smokeless tobacco history on file. She reports previous alcohol use. She reports that she does not use drugs. Family History:  The mother had breast cancer and that the father had bladder cancer  REVIEW OF SYSTEMS: Difficult to obtain. Progressive weakness, abdominal distention  Constitutional: No fever or chills. No night sweats, weight gain and weakness. Eyes: No eye discharge, double vision, or eye pain   HEENT: negative for sore mouth, sore throat, hoarseness and voice change   Respiratory: negative for cough , sputum, dyspnea, wheezing, hemoptysis, chest pain   Cardiovascular: negative for chest pain, dyspnea, palpitations, orthopnea, PND   Gastrointestinal: Abdominal discomfort, abdominal distention  Genitourinary: negative for frequency, dysuria, nocturia, urinary incontinence, and hematuria   Integument: negative for rash, skin lesions, bruises.    Hematologic/Lymphatic: negative for easy bruising, bleeding, lymphadenopathy, or petechiae   Endocrine: negative for heat or cold intolerance,weight changes, change in bowel habits and hair loss   Musculoskeletal: negative for myalgias, arthralgias, pain, joint swelling,and bone pain   Neurological: negative for headaches, dizziness, seizures, weakness, numbness    PHYSICAL EXAM:      /82   Pulse 68   Temp 97.3 °F (36.3 °C) (Oral)   Resp 22   Wt 184 lb 1.4 oz (83.5 kg)   SpO2 90%    Temp (24hrs), Av.6 °F (36.4 °C), Min:97 °F (36.1 °C), Max:98.4 °F (36.9 °C)    General appearance -ill-appearing female  Mental status - alert and cooperative   Eyes - pupils equal and reactive, extraocular eye movements intact   Ears - bilateral TM's and external ear canals normal   Mouth - mucous membranes moist, pharynx normal without lesions   Neck - supple, no significant adenopathy   Lymphatics - no palpable lymphadenopathy, no hepatosplenomegaly   Chest -creased air entry with both bases  Heart - normal rate, regular rhythm, normal S1, S2, no murmurs  Abdomen -distended with ascites  Neurological - alert, oriented, normal speech, no focal findings or movement disorder noted   Musculoskeletal - no joint tenderness, deformity or swelling   Extremities - peripheral pulses normal, no pedal edema, no clubbing or cyanosis   Skin - normal coloration and turgor, no rashes, no suspicious skin lesions noted ,    DATA:    Labs:   CBC:   Recent Labs     02/04/21  0535 02/04/21  1308 02/05/21  0633   WBC 6.1  --  7.0   HGB 14.0 14.3 13.4   HCT 45.9 47.1 45.6     --  265     BMP:   Recent Labs     02/04/21  0535 02/05/21  0633    136   K 3.8 3.7   CO2 28 27   BUN 19 14   CREATININE 0.69 0.50   LABGLOM >60 >60   GLUCOSE 110* 79     PT/INR:   Recent Labs     02/04/21  0535 02/05/21  0633   PROTIME 18.4* 14.5*   INR 1.8 1.4       IMAGING DATA:      Primary Problem  Pelvic mass    Active Hospital Problems    Diagnosis Date Noted    Elevated INR [R79.1]     Hypotension [I95.9]     Malignant pleural effusion [J91.0]     Pleural effusion, bilateral [J90] 02/03/2021    Abdominal ascites [R18.8] 02/03/2021    Thoracic aortic aneurysm without rupture ([facility] [address] [I71.2] 02/03/2021    Hearing impaired [H91.90] 02/03/2021    Hypoxia [R09.02] 02/03/2021    Malignant neoplasm of left ovary (HCC) [C56.2] 02/03/2021    Acute hypoxemic respiratory failure (HCC) [J96.01] 02/03/2021    Elevated d-dimer [R79.89] 02/03/2021    Hyperlipidemia [E78.5]     Hypertension [I10]     Thyroid disease [E07.9]     Pelvic mass [R19.00] 02/02/2021         IMPRESSION:   1. Pelvic mass  2. Abdominal ascites and carcinomatosis  3. Pleural effusion  4. The picture is highly suggestive of with advanced ovarian cancer    RECOMMENDATIONS:  1. Explaining the imaging study and the fact that her cancer marker is elevated. Pleural tap positive for malignancy. Explained to patient. 2. The plan for chemotherapy with carboplatin and Taxol to be followed likely by debulking surgery  3. Genetic counseling. 4. Considering her family history, I am highly suspicious that she has a genetic disorder.   We will arrange for genetic counselor to see her as an outpatient  Plan for chemotherapy, scheduled for Monday at 81 in our office at

## 2021-02-06 NOTE — PROGRESS NOTES
pleural effusion , hypoxic needing 4 L nasal cannula, elevated D-dimer at 19 and incidental finding of 5.6 cm descending aortic aneurysm along with transaminitis and elevated LD. Up for Covid and came back negative. Patient was transferred to our facility for further management. I saw her later today after she had her thoracentesis , she is a still on 4 L nasal cannula feeling better after the 700 mile or out      Review of Systems:     Review of Systems   Constitutional: Positive for activity change. Negative for chills, diaphoresis and fever. HENT: Negative for congestion. Eyes: Negative for visual disturbance. Respiratory: Positive for shortness of breath (much better). Negative for cough, chest tightness and wheezing. Cardiovascular: Negative for chest pain, palpitations and leg swelling. Gastrointestinal: Negative for abdominal pain, blood in stool, constipation, diarrhea, nausea and vomiting. Abdominal distention: better. Genitourinary: Negative for difficulty urinating. Neurological: Positive for weakness. Negative for dizziness, light-headedness, numbness and headaches. All other systems reviewed and are negative. Medications:      Allergies:  No Known Allergies    Current Meds:   Scheduled Meds:    carvedilol  6.25 mg Oral BID WC    cefTRIAXone (ROCEPHIN) IV  1,000 mg Intravenous Q24H    sodium chloride flush  10 mL Intravenous 2 times per day    enoxaparin  40 mg Subcutaneous Daily    famotidine  20 mg Oral Daily     Continuous Infusions:    sodium chloride 20 mL/hr at 02/05/21 0956     PRN Meds: labetalol, albuterol, sodium chloride flush, hydrALAZINE, potassium chloride **OR** potassium alternative oral replacement **OR** potassium chloride, magnesium sulfate, promethazine **OR** ondansetron, polyethylene glycol, nicotine, acetaminophen **OR** acetaminophen    Data:     Past Medical History:   has a past medical history of Hyperlipidemia, Hypertension, and Thyroid disease. Social History:   reports that she has quit smoking. She does not have any smokeless tobacco history on file. She reports previous alcohol use. She reports that she does not use drugs. Family History: History reviewed. No pertinent family history. Vitals:  /82   Pulse 84   Temp 98.4 °F (36.9 °C) (Temporal)   Resp 26   Wt 184 lb 1.4 oz (83.5 kg)   SpO2 94%   Temp (24hrs), Av °F (36.7 °C), Min:97 °F (36.1 °C), Max:99.2 °F (37.3 °C)    No results for input(s): POCGLU in the last 72 hours. I/O (24Hr): Intake/Output Summary (Last 24 hours) at 2021 075  Last data filed at 2021  Gross per 24 hour   Intake 330 ml   Output --   Net 330 ml       Labs:  Hematology:  Recent Labs     21  0535 21  1308 21  0633   WBC 6.1  --  7.0   RBC 5.82*  --  5.64*   HGB 14.0 14.3 13.4   HCT 45.9 47.1 45.6   MCV 78.9*  --  80.9*   MCH 24.1*  --  23.8*   MCHC 30.5  --  29.4   RDW 16.1*  --  15.9*     --  265   MPV 10.0  --  10.2   INR 1.8  --  1.4     Chemistry:  Recent Labs     21  0535 21  0633    136   K 3.8 3.7   CL 97* 100   CO2 28 27   GLUCOSE 110* 79   BUN 19 14   CREATININE 0.69 0.50   ANIONGAP 10 9   LABGLOM >60 >60   GFRAA >60 >60   CALCIUM 8.4* 8.3*     Recent Labs     21  1225   LABALBU 2.9*     ABG:No results found for: POCPH, PHART, PH, POCPCO2, HQE5EZF, PCO2, POCPO2, PO2ART, PO2, POCHCO3, VYG8TWI, HCO3, NBEA, PBEA, BEART, BE, THGBART, THB, WBH8UXY, FVRJ8ZWI, L1IFNAEB, O2SAT, FIO2  Lab Results   Component Value Date/Time    SPECIAL NOT REPORTED 2021 12:00 PM     Lab Results   Component Value Date/Time    CULTURE NO GROWTH 2 DAYS 2021 12:00 PM     PARACENTESIS FLUID:  POSITIVE FOR MALIGNANCY.    -- Diagnosis Comment --   CYTOMORPHOLOGY AND IMMUNOPHENOTYPE ARE CONSISTENT WITH SEROUS   ADENOCARCINOMA. OVARY WOULD BE FIRST CONSIDERATION FOR PRIMARY, WITH   ENDOMETRIUM AND PERITONEUM AS OTHER POSSIBILITIES. Radiology:  Xr Chest (2 Vw)    Result Date: 2/2/2021  Chest: 1. Cardiomegaly with bilateral pleural effusions and bibasilar airspace disease, right greater than left. These findings likely represent CHF related changes. Underlying infiltrate not excluded. Follow-up recommended to document resolution. 2. Underlying COPD. 3. Low lung volume study. Left knee: 1. Tricompartmental osteoarthrosis. Diffuse osteopenia. 2. No acute fracture or dislocation. Xr Knee Left (3 Views)    Result Date: 2/2/2021  Chest: 1. Cardiomegaly with bilateral pleural effusions and bibasilar airspace disease, right greater than left. These findings likely represent CHF related changes. Underlying infiltrate not excluded. Follow-up recommended to document resolution. 2. Underlying COPD. 3. Low lung volume study. Left knee: 1. Tricompartmental osteoarthrosis. Diffuse osteopenia. 2. No acute fracture or dislocation. Ct Abdomen Pelvis W Iv Contrast    Result Date: 2/2/2021  Malignant mass of the pelvis likely ovarian origin, with metastatic disease throughout the peritoneal cavity of the abdomen/pelvis with peritoneal and omental nodularity and thickening, and diffuse ascites. 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 Aneurysm of the descending thoracic aorta measures up to 5.6 cm. Ct Chest Pulmonary Embolism W Contrast    Result Date: 2/3/2021  Evaluation of pulmonary arteries is limited by motion artifact. No convincing evidence for embolism. Large bilateral pleural effusions with adjacent atelectasis. Large volume ascites within the visualized upper abdomen. Us Thoracentesis Which Side Should The Procedure Be Performed? Left    Result Date: 2/3/2021  Successful ultrasound guided thoracentesis. Us Thoracentesis Which Side Should The Procedure Be Performed?  Right    Result Date: 2/3/2021  Successful ultrasound guided thoracentesis. Physical Examination:        Physical Exam  Vitals signs and nursing note reviewed. Constitutional:       General: She is not in acute distress. HENT:      Head: Normocephalic and atraumatic. Eyes:      Conjunctiva/sclera: Conjunctivae normal.      Pupils: Pupils are equal, round, and reactive to light. Cardiovascular:      Rate and Rhythm: Normal rate and regular rhythm. Heart sounds: No murmur. Pulmonary:      Effort: Tachypnea and respiratory distress present. No accessory muscle usage. Breath sounds: No stridor. Examination of the right-lower field reveals decreased breath sounds. Examination of the left-lower field reveals decreased breath sounds. Decreased breath sounds present. No wheezing, rhonchi or rales. Comments: On nasal canula  Abdominal:      General: Bowel sounds are normal. There is no distension (improved after the paracentesis). Palpations: Abdomen is soft. Abdomen is not rigid. Tenderness: There is no abdominal tenderness. There is no guarding. Musculoskeletal:         General: No tenderness. Skin:     General: Skin is warm and dry. Findings: No erythema, lesion or rash. Neurological:      Mental Status: She is alert and oriented to person, place, and time. Cranial Nerves: No cranial nerve deficit. Motor: No seizure activity. Psychiatric:         Speech: Speech normal.         Behavior: Behavior normal. Behavior is cooperative.          Assessment:        Hospital Problems           Last Modified POA    * (Principal) Pelvic mass 2/3/2021 Yes    Pleural effusion, bilateral 2/3/2021 Yes    Abdominal ascites 2/3/2021 Yes    Thoracic aortic aneurysm without rupture ([address] 2/3/2021 Yes    Hearing impaired 2/3/2021 Yes    Hypoxia 2/3/2021 Yes    Malignant neoplasm of left ovary ([address] 2/3/2021 Yes    Acute hypoxemic respiratory failure ([address] 2/3/2021 Yes    Elevated d-dimer 2/3/2021 Yes    Hyperlipidemia 2/3/2021 Yes Hypertension 2/3/2021 Yes    Thyroid disease 2/3/2021 Yes    Malignant pleural effusion 2/4/2021 Yes          Plan:          Likely metastatic L ovarian cancer with malignant ascites and pleural effusion  S/P thoracentesis 2/3  S/P paracentesis 2/4, ascitic fluid came back positive for malignant cells [serous adenocarcinoma.]  Studies pending  Plan for chemo then debulking  Evaluated by oncology and gyn  Plan for port placement  today       Acute hypoxemic respiratory failure:  -Improving  -Likely secondary to pleural affusion and ascited, after large volume paracentesis needed BiPAP, now on supplemental oxygen     Descending thoracic aortic aneurysm :  - size is 5.6 cm  - BP control , add coreg  -Evaluated by vascular surgery team , continue  to monitor, F/U with repeat imaging in 3 moths per their recommendation     Episode of hypotension and tachycardia : repeat EKG unchanged, give small bolus and the albumin     -Elevated BNP and trops : get echocardiogram     - Elevated INR  : likely 2/2 malignany    Elevated D dimer : likely 2/2 #1 , DVT and PE ruled out     HTN: continue home medications    HPL: continue home medications    DVT prophylaxis    Patient was evaluated by gynecology/vascular surgery and oncology team as well   Family and patient has good understanding of the critical situation       Alli Quiros MD  2/6/2021  7:55 AM

## 2021-02-06 NOTE — PROGRESS NOTES
8.8  --  8.4* 8.3*   LABALBU  --  2.9*  --   --         Assessment/Plan:  Missy Baca 72 y.o. female    - Doing well, vitals stable    Pelvic mass (13x5cm)   - Plan for chemotherapy followed by debulking surgery    - Cytology positive for malignancy    - Mediport placed by IR    - S/p paracentesis and thoracentesis    -  reviewed and 616, CEA wnl    - Chemotherapy to start at 0900 Monday as an outpatient     AAA    - Vascular surgery to follow up outpatient     Hypoxia     - S/p b/l thoracentesis w/ 700ml from R and 450ml from left   - IM following     Patient is cleared for DC from a gyn onc standpoint and should follow up in office to discuss further treatment. GYN ONC to sign off at this time.        Patient Active Problem List    Diagnosis Date Noted    Malignant pleural effusion     Pleural effusion, bilateral 2021    Abdominal ascites 2021    Thoracic aortic aneurysm without rupture ([address] 2021    Hearing impaired 2021    Hypoxia 2021    Malignant neoplasm of left ovary ([address] 2021    Acute hypoxemic respiratory failure ([address] 2021    Elevated d-dimer 2021    Hyperlipidemia     Hypertension     Thyroid disease     Pelvic mass 2021         Erasmo Pedroza  Ob/Gyn Resident  Pager: [phone]  2021, 3:27 AM

## 2021-02-06 NOTE — PROGRESS NOTES
Discharge instructions given to patient and  via Melinda Garcia . Patient instructed on follow up appointments, new medications and a list of PCP's to choose from given. Patient and  asked appropriate questions and demonstrated understanding of discharge instructions.   Waiting on meds to beds right now

## 2021-02-06 NOTE — DISCHARGE SUMMARY
history of hypertension hyperlipidemia and hypothyroidism presented to [facility] ER after episode of weakness and recent history of recurrent falls found to have a new pelvic mass suspicious for ovarian cancer with significant ascites and bilateral pleural effusion , hypoxic needing 4 L nasal cannula, elevated D-dimer at 19 and incidental finding of 5.6 cm descending aortic aneurysm along with transaminitis and elevated LD.  Covid  came back negative. Patient was transferred to our facility for further management.      During the admission patient was managed as follow    Likely metastatic L ovarian cancer with malignant ascites and pleural effusion  S/P thoracentesis 2/3  S/P paracentesis 2/4, ascitic fluid came back positive for malignant cells [serous adenocarcinoma.]  Plan for chemo to start 2/8 starting  then debulking  Evaluated by oncology and gyn, F/U as OP   S/P  port placement  2/5  Started empiric Rocephin on 2/3        Acute hypoxemic respiratory failure:  -Improving  -Likely secondary to pleural affusion and ascited, after large volume paracentesis needed BiPAP, now on supplemental oxygen        Descending thoracic aortic aneurysm :  - size is 5.6 cm  - BP control , added coreg  -Evaluated by vascular surgery team , continue  to monitor, F/U with repeat imaging in 3 moths per their recommendation      Episode of hypotension and tachycardia : repeat EKG unchanged, responded to  small bolus and albumin      -Elevated BNP and trops :  echocardiogram with preserved ejection fraction and no wall motion abnormality     - Elevated INR  : likely 2/2 malignany     Elevated D dimer : likely 2/2 #1 , DVT and PE ruled out      HTN: continue home medications     HPL: continue home medications     DVT prophylaxis     Patient was evaluated by gynecology/vascular surgery and oncology team as well   Family and patient has good understanding of the critical situation     Med rec done  Scripts added  30+ minutes spent  Home O2 done , was not qualified      Significant Studies:       Echo 2/5  Summary  Normal LV size , mildly increased wall thickness. No obvious wall motion abnormality seen. Normal LV systolic function with LVEF >55%. Normal RV size and function. LA and RA appears normal in size. No obvious significant structural valvular abnormality noted. No significant valvular stenosis or regurgitation noted. Normal aortic root dimension. No significant pericardial effusion noted. No obvious intra-cardiac mass or shunt noted. IVC normal diameter and inspiratory collapse indicating normal RA pressure.       -- Diagnosis --   PARACENTESIS FLUID:  POSITIVE FOR MALIGNANCY. -- Diagnosis Comment --   CYTOMORPHOLOGY AND IMMUNOPHENOTYPE ARE CONSISTENT WITH SEROUS   ADENOCARCINOMA. OVARY WOULD BE FIRST CONSIDERATION FOR PRIMARY, WITH   ENDOMETRIUM AND PERITONEUM AS OTHER POSSIBILITIES. Radiology:  Xr Chest (2 Vw)    Result Date: 2/2/2021  Chest: 1. Cardiomegaly with bilateral pleural effusions and bibasilar airspace disease, right greater than left. These findings likely represent CHF related changes. Underlying infiltrate not excluded. Follow-up recommended to document resolution. 2. Underlying COPD. 3. Low lung volume study. Left knee: 1. Tricompartmental osteoarthrosis. Diffuse osteopenia. 2. No acute fracture or dislocation. Xr Knee Left (3 Views)    Result Date: 2/2/2021  Chest: 1. Cardiomegaly with bilateral pleural effusions and bibasilar airspace disease, right greater than left. These findings likely represent CHF related changes. Underlying infiltrate not excluded. Follow-up recommended to document resolution. 2. Underlying COPD. 3. Low lung volume study. Left knee: 1. Tricompartmental osteoarthrosis. Diffuse osteopenia. 2. No acute fracture or dislocation.      Ct Abdomen Pelvis W Iv Contrast    Result Date: 2/2/2021  Malignant mass of the pelvis likely ovarian origin, with metastatic disease throughout the peritoneal cavity of the abdomen/pelvis with peritoneal and omental nodularity and thickening, and diffuse ascites. 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 Aneurysm of the descending thoracic aorta measures up to 5.6 cm. Xr Chest Portable    Result Date: 2/4/2021  Significantly improved bilateral pleural effusions and bibasilar airspace disease. Ct Chest Pulmonary Embolism W Contrast    Result Date: 2/3/2021  Evaluation of pulmonary arteries is limited by motion artifact. No convincing evidence for embolism. Large bilateral pleural effusions with adjacent atelectasis. Large volume ascites within the visualized upper abdomen. Ir Port Placement > 5 Years    Result Date: 2/5/2021  Successful placement of right chest subcutaneous Power Port with tip located in the SVC. Port is ready for use. FINDINGS: Fluoroscopic image demonstrates the tip of the port at the cavo-atrial junction . Us Thoracentesis Which Side Should The Procedure Be Performed? Right    Result Date: 2/4/2021  Successful ultrasound guided thoracentesis. Us Thoracentesis Which Side Should The Procedure Be Performed? Left    Result Date: 2/4/2021  Successful ultrasound guided thoracentesis. Us Guided Paracentesis    Result Date: 2/4/2021  Successful ultrasound guided paracentesis. Consultations:    Consults:     Final Specialist Recommendations/Findings:   IP CONSULT TO ONCOLOGY  IP CONSULT TO VASCULAR SURGERY  IP CONSULT TO GYNECOLOGIC ONCOLOGY  IP CONSULT TO CRITICAL CARE      The patient was seen and examined on day of discharge and this discharge summary is in conjunction with any daily progress note from day of discharge. Discharge plan:     Disposition: Home    Physician Follow Up:      Duy Ash MD  [address] [place] [id number] [phone]    Go on 2/8/2021  930am    Refugia Kannan Ryanalena Shepherd, [address]  [address] [facility] [phone]    In 2 weeks  for followup from hospital stay    [address] [facility],[address] [facility] [address]  [phone]    In 1 week  for hospital follow up       Requiring Further Evaluation/Follow Up POST HOSPITALIZATION/Incidental Findings:     Diet: low salt     Activity: As tolerated    Instructions to Patient:     Discharge Medications:      Medication List      START taking these medications    carvedilol 6.25 MG tablet  Commonly known as: COREG  Take 1 tablet by mouth 2 times daily (with meals)     famotidine 20 MG tablet  Commonly known as: PEPCID  Take 1 tablet by mouth daily  Start taking on: February 7, 2021     levoFLOXacin 500 MG tablet  Commonly known as: LEVAQUIN  Take 1 tablet by mouth daily for 5 days     polyethylene glycol 17 g packet  Commonly known as: GLYCOLAX  Take 17 g by mouth daily as needed for Constipation           Where to Get Your Medications      These medications were sent to [facility] [address] [facility], [address] [place] [address]  [address] [facility] [address] [facility] [address], [address] [address]    Phone: [phone]   · carvedilol 6.25 MG tablet  · famotidine 20 MG tablet  · levoFLOXacin 500 MG tablet  · polyethylene glycol 17 g packet         No discharge procedures on file. Time Spent on discharge is  34 mins in patient examination, evaluation, counseling as well as medication reconciliation, prescriptions for required medications, discharge plan and follow up. Electronically signed by   Clarissa Carrillo MD  2/6/2021  3:28 PM      Thank you Dr. Vikram Cronin primary care provider on file. for the opportunity to be involved in this patient's care.

## 2021-02-06 NOTE — PROGRESS NOTES
CLINICAL PHARMACY NOTE: MEDS TO [address] Select Patient?: No  Total # of Prescriptions Filled: 2   The following medications were delivered to the patient:  · Delivered carvedilol and levofloxacin to room (miralax and pepcid otc told to patient)  Total # of Interventions Completed: 0  Time Spent (min): 0    Additional Documentation:

## 2021-02-06 NOTE — DISCHARGE INSTR - COC
Continuity of Care Form    Patient Name: Taibtha Garcia   :    MRN:  [id number]    Admit date:  2021  Discharge date:  ***    Code Status Order: Full Code   Advance Directives:     Admitting Physician:  Nuzhat Andersen MD  PCP: No primary care provider on file. Discharging Nurse: [facility] Unit/Room#: 9509/5499-30  Discharging Unit Phone Number: ***    Emergency Contact:   Extended Emergency Contact Information  Primary Emergency Contact: Km Chavez, [address] [facility]  Home Phone: [phone]  Relation: Spouse    Past Surgical History:  Past Surgical History:   Procedure Laterality Date    IR PORT PLACEMENT EQUAL OR GREATER THAN 5 YEARS  2021    IR PORT PLACEMENT EQUAL OR GREATER THAN 5 YEARS 2021 Kris Damico MD [facility]       Immunization History: There is no immunization history on file for this patient.     Active Problems:  Patient Active Problem List   Diagnosis Code    Pelvic mass R19.00    Pleural effusion, bilateral J90    Abdominal ascites R18.8    Thoracic aortic aneurysm without rupture (HCC) I71.2    Hearing impaired H91.90    Hypoxia R09.02    Malignant neoplasm of left ovary (HCC) C56.2    Acute hypoxemic respiratory failure (HCC) J96.01    Elevated d-dimer R79.89    Hyperlipidemia E78.5    Hypertension I10    Thyroid disease E07.9    Malignant pleural effusion J91.0    Elevated INR R79.1    Hypotension I95.9       Isolation/Infection:   Isolation          No Isolation        Patient Infection Status     Infection Onset Added Last Indicated Last Indicated By Review Planned Expiration Resolved Resolved By    None active    Resolved    COVID-19 Rule Out 21 COVID-19 (Ordered)   21 Rule-Out Test Resulted          Nurse Assessment:  Last Vital Signs: /82   Pulse 73   Temp 97.3 °F (36.3 °C) (Oral)   Resp 26   Wt 184 lb 1.4 oz (83.5 kg)   SpO2 95%     Last documented pain score (0-10 scale): Pain Level: 0  Last Weight:   Wt Readings from Last 1 Encounters:   21 184 lb 1.4 oz (83.5 kg)     Mental Status:  {IP PT MENTAL STATUS:}    IV Access:  { LUIS IV ACCESS:516066184}    Nursing Mobility/ADLs:  Walking   {CHP DME IJXY:776687432}  Transfer  {CHP DME EMJL:011177375}  Bathing  {CHP DME ACOC:612831788}  Dressing  {CHP DME QCSE:282658384}  Toileting  {CHP DME OBHF:847006381}  Feeding  {CHP DME SYLM:637319200}  Med Admin  {CHP DME LITW:540274237}  Med Delivery   { LUIS MED Delivery:055326049}    Wound Care Documentation and Therapy:        Elimination:  Continence:   · Bowel: {YES / P}  · Bladder: {YES / LX:26146}  Urinary Catheter: {Urinary Catheter:141365654}   Colostomy/Ileostomy/Ileal Conduit: {YES / JW:44076}       Date of Last BM: ***    Intake/Output Summary (Last 24 hours) at 2021 1143  Last data filed at 2021 0800  Gross per 24 hour   Intake 570 ml   Output --   Net 570 ml     I/O last 3 completed shifts: In: 330 [P.O.:320;  I.V.:10]  Out: -     Safety Concerns:     [address] [employer] Safety Concerns:287090959}    Impairments/Disabilities:      [address] [employer] Impairments/Disabilities:406013621}    Nutrition Therapy:  Current Nutrition Therapy:   [address] [employer] Diet List:857676044}    Routes of Feeding: {CHP DME Other Feedings:307935332}  Liquids: {Slp liquid thickness:23140}  Daily Fluid Restriction: {CHP DME Yes amt example:690961937}  Last Modified Barium Swallow with Video (Video Swallowing Test): {Done Not Done LEXS:647902453}    Treatments at the Time of Hospital Discharge:   Respiratory Treatments: ***  Oxygen Therapy:  {Therapy; copd oxygen:17413}  Ventilator:    { CC Vent KHZQ:928874049}    Rehab Therapies: {THERAPEUTIC INTERVENTION:3619167096}  Weight Bearing Status/Restrictions: [address] HODGES Weight Bearin}  Other Medical Equipment (for information only, NOT a DME order):  {EQUIPMENT:096983486}  Other Treatments: ***    Patient's personal belongings (please select

## 2021-02-08 NOTE — TELEPHONE ENCOUNTER
Upon review of chart noted pt dc'd home & scheduled to start chemotherapy this am @ [facility]. Will continue to follow.

## 2021-02-08 NOTE — TELEPHONE ENCOUNTER
Nette Velasco MD VISIT & [address] [facility] IN TO SEE PATIENT  ORDERS RECEIVED  CONTINUE CHEMOTHERAPY PER ORDERS  RV 3 WEEKS W/CHEMO & LABS  MD VISIT 3/1/21 @9:30AM  [email or web address]  SCRIPTS SENT TO PATIENTS PHARMACY  AVS PRINTED AND GIVEN TO PATIENT WITH INSTRUCTIONS  PATIENT REMAINS IN [address] [place] [address]

## 2021-02-08 NOTE — FLOWSHEET NOTE
Writer encountered and visited briefly with Patient and Spouse while rounding the infusion clinic with lachelle kerr. Writer learned that both Patient and Spouse were hearing impaired. Writer communicated with Pt and Spouse by writing. Writer listed lunch options, and Pt and Spouse checked their preferences. Writer wrote to Patient that she is available by support. 02/08/21 1702   Encounter Summary   Services provided to: Patient and family together   Referral/Consult From: [facility]   (2/8/21)   Complexity of Encounter Low   Length of Encounter 15 minutes   Routine   Type Initial   Assessment Calm; Approachable   Intervention Sustaining presence/ Ministry of presence   Outcome Expressed gratitude     Electronically signed by Roxane Duverney, Oncology Outpatient [facility], [address] [facility] Radiation Oncology  2/8/2021  5:04 PM

## 2021-02-08 NOTE — PROGRESS NOTES
WRITER MET WITH PT AND HER SPOUSE FOLLOWING MD EXAM THIS AM.    I PAD IS USED FOR , ERICKA AS BOTH PT AND SPOUSE ARE DEAF. CONFIRMED ABILITY TO READ MATERIALS. REVIEWED PT NOTEBOOK. MD HAS PRESCRIBED THE ANTIEMETICS, EMLA AND PPI AS WELL AS REVIEWED USE. WRITER REVIEWED THE CHEMOTHERAPY CHECKLIST, MC SHEETS, CONTACT NUMBERS. DISTRESS TOOL IS COMPLETED AND REVIEWED WITH MD. PT WAS IN AGREEMENT TO [facility] AND DID NOT HAVE AN AGENCY OF CHOICE. CONTACTED [facility] , SPOKE WITH CONCETTA. ORDERS FAXED WITH MD NOTE AND DEMOGRAPHICS TO 1 [phone] W/ CONFIRMATION. LUIGI HAS HAD A COUPLE OF FALLS AT HOME THE PAST COUPLE OF MONTHS AND WOULD LIKE NURSE EVAL AS WELL AS PHYSICAL THERAPY. FUNCTIONAL ASSESSMENT TOOL WAS COMPLETED AS WELL. QUESTIONS WERE ASKED AND ANSWERED THROUGH .

## 2021-02-08 NOTE — PROGRESS NOTES
Lymphatics - no palpable lymphadenopathy, no hepatosplenomegaly   Chest -creased air entry with both bases  Heart - normal rate, regular rhythm, normal S1, S2, no murmurs  Abdomen -distended with ascites  Neurological - alert, oriented, normal speech, no focal findings or movement disorder noted   Musculoskeletal - no joint tenderness, deformity or swelling   Extremities - peripheral pulses normal, no pedal edema, no clubbing or cyanosis   Skin - normal coloration and turgor, no rashes, no suspicious skin lesions noted ,    REVIEW OF LABORATORY DATA:     Lab Results   Component Value Date    WBC 8.5 02/08/2021    HGB 14.2 02/08/2021    HCT 46.7 02/08/2021    MCV 78.5 (L) 02/08/2021     02/08/2021       Chemistry        Component Value Date/Time     02/05/2021 0633    K 3.7 02/05/2021 0633     02/05/2021 0633    CO2 27 02/05/2021 0633    BUN 14 02/05/2021 0633    CREATININE 0.50 02/05/2021 0633        Component Value Date/Time    CALCIUM 8.3 (L) 02/05/2021 0633    ALKPHOS 134 (H) 02/02/2021 1300    AST 19 02/02/2021 1300    ALT 8 02/02/2021 1300    BILITOT 0.75 02/02/2021 1300        Lab Results   Component Value Date     616 (H) 02/03/2021   Pathology, cytology from pleural fluid 2/3/2021  1.  THORACENTESIS FLUID LEFT:           POSITIVE FOR MALIGNANCY.             2.  THORACENTESIS FLUID RIGHT:           POSITIVE FOR MALIGNANCY. Cytology from paracentesis  -- Diagnosis --   PARACENTESIS FLUID:  POSITIVE FOR MALIGNANCY. -- Diagnosis Comment --   CYTOMORPHOLOGY AND IMMUNOPHENOTYPE ARE CONSISTENT WITH SEROUS   ADENOCARCINOMA. OVARY WOULD BE FIRST CONSIDERATION FOR PRIMARY, WITH   ENDOMETRIUM AND PERITONEUM AS OTHER POSSIBILITIES. REVIEW OF RADIOLOGICAL RESULTS:         IMPRESSION:   1. Pelvic mass  2. Abdominal ascites and carcinomatosis  3.  Pleural effusion , cytologically positive for metastatic ovarian cancer 4. Plan chemotherapy with Taxol and carboplatin on a neoadjuvant fashion before optimal cytoreduction    PLAN:  1. Her lab work was reviewed. 2. I completed baseline review of symptoms. 3. I explained staging and reviewed prognostic data based on treatment response. 4. We had detailed review of treatment plan, dosing schedule, potential side effects, and goals of treatment. 5. I discussed plan for monitoring for response including comprehensive evaluation after cycle #3. 6. Access to hair prosthesis was discussed. 7. The patient has expressed understanding of the plan and consented for treatment, we will proceed today as per orders. 8. I am writing for Zofran, Prilosec and Emla cream and discussed dosing. 9. Return in 3 weeks. This appointment was aided by  service                             This note is created with the assistance of a speech recognition program.  While intending to generate a document that actually reflects the content of the visit, the document can still have some errors including those of syntax and sound a like substitutions which may escape proof reading. It such instances, actual meaning can be extrapolated by contextual diversion.

## 2021-02-09 NOTE — TELEPHONE ENCOUNTER
Name: Sadie Mcneill  : 1950  MRN: [id number]    Oncology Navigation- Initial Note:    Intake-  Contact Type: Telephone  Notes: Spoke with pt's son, Evelyn Unger, notified writer navigating oncology care & may contact writer tye @ [phone]. Discussed pt's potential barriers to care. [facility] stated pt & spouse deaf, able to communicate per telephone with [employer] service. [facility] Samples stated pt using walker when ambulates & verbalized financial concerns r/t pt on fixed income with Medicare A&B only. Noah Merino will contact René Stahl, [facility]/ , & request contact. Discussed community resources, offered referral to [facility]. Natanael declined [facility] referral & agreeable to [facility]. [facility] Samples stated pt concerned about hair loss. Discussed [facility] free wig bank. [facility] Samples denied further questions/concerns. [facility] financial assistance application completed with [facility] Cornel. Attempted to contact Jerri [facility], no answer,  left updated on referral.  Spoke with René Stahl, updated on pt. [facility] written materials, [facility] free wig bank document, \"Who to Call When\" document, & writer's business card mailed to pt. Will continue to follow.     Electronically signed by Susan Liu RN on 2021 at 11:42 AM

## 2021-02-10 NOTE — TELEPHONE ENCOUNTER
MD RESPONDED AND WILL SIGN DEATH CERTICIATE. Cristhian Corona NOTIFIED. WILL SEND DEATH CERTIFICATE TOMORROW.

## 2021-02-10 NOTE — TELEPHONE ENCOUNTER
Name: Rene Chowdhury  : 1950  MRN: [id number]    Oncology Navigation Follow-Up Note    Contact Type:  Telephone  Notes: Pt's son, Leydi Navas, called in stating pt passed away this am.  Support given. Spoke with Jeannie Rios, [facility]/ , updated on pt. Attempted to contact [facility], [address] [place] [address], no answer, VM left updated on pt.     Electronically signed by Eladio Samuel RN on 2/10/2021 at 10:46 AM

## 2021-02-10 NOTE — TELEPHONE ENCOUNTER
SPOKE [address] [facility] . REPORTS LUIGI  AT [id number] 02/10/21 IN HER SLEEP.  HAS RELEASED BODY AND THE  HOME IS INQUIRING IF DR [employer] WILL SIGN DEATH CERTIFICATE. PERFECT SERVE MESSAGE TO MD AS REBB IS WAITING FOR DETERMINATION.

## 2021-02-15 ENCOUNTER — TELEPHONE (OUTPATIENT)
Dept: ONCOLOGY | Age: 71
End: 2021-02-15

## 2021-03-08 LAB
CULTURE: NORMAL
CULTURE: NORMAL
Lab: NORMAL
Lab: NORMAL
SPECIMEN DESCRIPTION: NORMAL
SPECIMEN DESCRIPTION: NORMAL

## 2021-03-22 LAB
CULTURE: NORMAL
CULTURE: NORMAL
DIRECT EXAM: NORMAL
DIRECT EXAM: NORMAL
Lab: NORMAL
Lab: NORMAL
SPECIMEN DESCRIPTION: NORMAL
SPECIMEN DESCRIPTION: NORMAL

## 2023-07-10 ENCOUNTER — TELEPHONE (OUTPATIENT)
Dept: INFUSION THERAPY | Facility: MEDICAL CENTER | Age: 73
End: 2023-07-10

## 2023-07-10 NOTE — TELEPHONE ENCOUNTER
[address] [facility] cancer incidence surveillance system [employer]. Req information on pt's cancer diagnosis and information to form and faxed to [phone] also esent to [email or web address]  Form to front office to scan

## 2023-12-09 NOTE — PROGRESS NOTES
Currently inpatient, seizures are being addressed. E-consult completed by Dr. Jah Pierce today due to multiple seizures. "Suspect breakthrough seizures, status epilepticus in this patient with known refractory epilepsy in the setting of missing her typical AEDs due to dislodge PEG tube and IV access just now obtained."    See note for more information. Outpatient neuro appt scheduled with Dr. Bebe Fonseca on 1/25/24. Training, Endurance Training, Stair training  Safety Devices  Type of devices: Nurse notified, Left in bed, Call light within reach    G-Code     OutComes Score     AM-PAC Score  AM-PAC Inpatient Mobility Raw Score : 16 (02/04/21 1235)  AM-PAC Inpatient T-Scale Score : 40.78 (02/04/21 1235)  Mobility Inpatient CMS 0-100% Score: 54.16 (02/04/21 1235)  Mobility Inpatient CMS G-Code Modifier : CK (02/04/21 1235)     Goals  Short term goals  Time Frame for Short term goals: 10 visits  Short term goal 1: transfers with SBA  Short term goal 2: amb 150 ft with a RW x SBA  Short term goal 3: ascend/descend 4 steps with SBA  Short term goal 4: 20-30 min exercise program x SBA  Patient Goals   Patient goals : Return home     Therapy Time   Individual Concurrent Group Co-treatment   Time In 1005         Time Out 1030         Minutes [id number] [address] [facility] Stephany Quinn, PT